# Patient Record
Sex: FEMALE | Race: WHITE | NOT HISPANIC OR LATINO | Employment: FULL TIME | URBAN - METROPOLITAN AREA
[De-identification: names, ages, dates, MRNs, and addresses within clinical notes are randomized per-mention and may not be internally consistent; named-entity substitution may affect disease eponyms.]

---

## 2018-09-20 ENCOUNTER — TRANSCRIBE ORDERS (OUTPATIENT)
Dept: ADMINISTRATIVE | Facility: HOSPITAL | Age: 48
End: 2018-09-20

## 2018-09-20 DIAGNOSIS — Z12.31 VISIT FOR SCREENING MAMMOGRAM: Primary | ICD-10-CM

## 2018-09-27 ENCOUNTER — OFFICE VISIT (OUTPATIENT)
Dept: OBSTETRICS AND GYNECOLOGY | Facility: CLINIC | Age: 48
End: 2018-09-27

## 2018-09-27 VITALS
WEIGHT: 205.6 LBS | HEIGHT: 68 IN | DIASTOLIC BLOOD PRESSURE: 78 MMHG | BODY MASS INDEX: 31.16 KG/M2 | SYSTOLIC BLOOD PRESSURE: 118 MMHG

## 2018-09-27 DIAGNOSIS — N32.81 DI (DETRUSOR INSTABILITY): ICD-10-CM

## 2018-09-27 DIAGNOSIS — F32.A DEPRESSION, UNSPECIFIED DEPRESSION TYPE: ICD-10-CM

## 2018-09-27 DIAGNOSIS — R10.31 RLQ ABDOMINAL PAIN: ICD-10-CM

## 2018-09-27 DIAGNOSIS — Z00.00 ANNUAL PHYSICAL EXAM: Primary | ICD-10-CM

## 2018-09-27 PROCEDURE — 99213 OFFICE O/P EST LOW 20 MIN: CPT | Performed by: OBSTETRICS & GYNECOLOGY

## 2018-09-27 PROCEDURE — 99386 PREV VISIT NEW AGE 40-64: CPT | Performed by: OBSTETRICS & GYNECOLOGY

## 2018-09-27 RX ORDER — OXYBUTYNIN CHLORIDE 5 MG/1
5 TABLET, EXTENDED RELEASE ORAL DAILY
Qty: 30 TABLET | Refills: 3 | Status: SHIPPED | OUTPATIENT
Start: 2018-09-27 | End: 2019-01-23

## 2018-09-27 NOTE — PROGRESS NOTES
AIDEE Briceno  is a 47 y.o. female who presents for several issues.  First, she would like to reestablish care and have a routine gynecologic exam.  She uses a Mirena for contraception.  It has been in place for 3 years and she is very satisfied with it.  She does not have menstrual cycles.  Second, she has an overactive bladder.  She reports strong urge to urinate followed by the loss of urine.  This has worsened over the years.  It has not improved with the use of Kegel's exercise.  It is not worsened by laughing coughing or sneezing.  It happens intermittently throughout the day.  Third, she has some right lower quadrant pain.  She denies any change in bowel habits.  No nausea or vomiting.  No fever or chills.  She is concerned regarding a family history of ovarian cancer.  Also, she has symptoms of anxiety and depression.  She reports fatigue, helplessness and hopelessness.  She denies any suicidal or homicidal ideation.    Chief Complaint   Patient presents with   • New Gyn     Patient is here for a new gyn annual and bladder issues with depresssion.       Past Medical History:   Diagnosis Date   • MS (multiple sclerosis) (CMS/[facility])        Past Surgical History:   Procedure Laterality Date   •  SECTION         Social History     Social History   • Marital status: Unknown     Spouse name: N/A   • Number of children: N/A   • Years of education: N/A     Occupational History   • Not on file.     Social History Main Topics   • Smoking status: Former Smoker   • Smokeless tobacco: Never Used   • Alcohol use No   • Drug use: No   • Sexual activity: No     Other Topics Concern   • Not on file     Social History Narrative   • No narrative on file       The following portions of the patient's history were reviewed and updated as appropriate: allergies, current medications, past family history, past medical history, past social history, past surgical history and problem list.    Review of Systems is positive for  vegetative signs of depression.  It is positive for urinary incontinence.  It is positive for right lower quadrant abdominal pain.  It is negative for nausea or vomiting.  It is negative for fever or chills.  All other systems are reviewed and are negative          Physical Exam   Constitutional: She is oriented to person, place, and time. She appears well-developed and well-nourished.   HENT:   Head: Normocephalic and atraumatic.   Cardiovascular: Normal rate and regular rhythm.    Pulmonary/Chest: Effort normal and breath sounds normal. She has no wheezes. She has no rales.   There are no palpable breast lumps.  Nipple discharge and axillary adenopathy are absent.   Abdominal: Soft. She exhibits no distension. There is no tenderness.   Genitourinary: Vagina normal and uterus normal. There is no lesion on the right labia. There is no lesion on the left labia. Cervix exhibits no motion tenderness. Right adnexum displays no mass and no tenderness. Left adnexum displays no mass and no tenderness. No vaginal discharge found.   Neurological: She is alert and oriented to person, place, and time.   Skin: Skin is warm and dry.   Nursing note and vitals reviewed.      Assessment    Jacqueline was seen today for new gyn.    Diagnoses and all orders for this visit:    Annual physical exam  -     Mammo screening bilateral w CAD; Future    DI (detrusor instability)    Depression, unspecified depression type    RLQ abdominal pain  -     US Non-ob Transvaginal    Other orders  -     oxybutynin XL (DITROPAN XL) 5 MG 24 hr tablet; Take 1 tablet by mouth Daily.        Plan  1. Normal gynecologic exam.  Pap smear performed.  2. Counseled regarding a cog recommendations for mammography every 1-2 years between the ages of 40 and 50.  The patient would like to proceed with a mammogram this year.  We will help her to arrange this.  3. Urge incontinence.  Counseled.  We discussed the pathophysiology of this as well as options for management.   The patient would like to try treatment with Ditropan.  We discussed the benefits, risks and alternatives to this.  She will follow up in 6 weeks to assess effectiveness.  4. Depression.  The patient denies suicidal or homicidal ideation.  Counseled.  She would like to consider an antidepressant.  I recommend that we cannot start to medications at the same time, so side effects can be adequately assessed.  The patient agrees with this.  She will follow this for now and when she returns for 6 week checkup, we can institute an antidepressant.  5. Right lower quadrant abdominal pain.  There is been no change in bowel habits.  No nausea or vomiting.  No fever or chills.  Counseled.  The patient is very concerned regarding the possibility of ovarian cancer.  I did not feel an adnexal mass on exam today.  We will check an ultrasound to fully assess the adnexa.    6. Return in about 6 weeks (around 11/8/2018).    History   Smoking Status   • Former Smoker   7.     8.

## 2018-10-01 LAB
CONV .: NORMAL
CYTOLOGIST CVX/VAG CYTO: NORMAL
CYTOLOGY CVX/VAG DOC THIN PREP: NORMAL
DX ICD CODE: NORMAL
HIV 1 & 2 AB SER-IMP: NORMAL
OTHER STN SPEC: NORMAL
PATH REPORT.FINAL DX SPEC: NORMAL
STAT OF ADQ CVX/VAG CYTO-IMP: NORMAL

## 2018-10-09 ENCOUNTER — HOSPITAL ENCOUNTER (OUTPATIENT)
Dept: MAMMOGRAPHY | Facility: HOSPITAL | Age: 48
Discharge: HOME | End: 2018-10-09
Attending: OBSTETRICS & GYNECOLOGY | Admitting: OBSTETRICS & GYNECOLOGY

## 2018-10-09 DIAGNOSIS — Z12.31 VISIT FOR SCREENING MAMMOGRAM: ICD-10-CM

## 2018-10-09 PROCEDURE — 77063 BREAST TOMOSYNTHESIS BI: CPT

## 2018-10-09 PROCEDURE — 77067 SCR MAMMO BI INCL CAD: CPT

## 2019-01-23 ENCOUNTER — TELEPHONE (OUTPATIENT)
Dept: OBSTETRICS AND GYNECOLOGY | Facility: CLINIC | Age: 49
End: 2019-01-23

## 2019-01-23 RX ORDER — FESOTERODINE FUMARATE 4 MG/1
4 TABLET, EXTENDED RELEASE ORAL
Qty: 30 TABLET | Refills: 11 | Status: SHIPPED | OUTPATIENT
Start: 2019-01-23 | End: 2019-06-11

## 2019-01-23 NOTE — TELEPHONE ENCOUNTER
Please contact the patient and let her know that a prescription for Toviaz has been sent to her pharmacy per her request.  Also, when she was in the office in September, she was very worried about her ovaries and an ultrasound had been ordered.  She has not scheduled the ultrasound.  Please inquire if she has had the ultrasound somewhere else or if she would like to schedule it here.  Thank you.

## 2019-01-23 NOTE — TELEPHONE ENCOUNTER
PT called, would like to try nkechi. States rx you prescribed works some but would like to try this medication. Pharmacy in system. PT #[phone]

## 2019-02-25 ENCOUNTER — TELEPHONE (OUTPATIENT)
Dept: OBSTETRICS AND GYNECOLOGY | Facility: CLINIC | Age: 49
End: 2019-02-25

## 2019-06-04 ENCOUNTER — TELEPHONE (OUTPATIENT)
Dept: FAMILY MEDICINE CLINIC | Facility: CLINIC | Age: 49
End: 2019-06-04

## 2019-06-04 NOTE — TELEPHONE ENCOUNTER
Pt has appointment to establish with E[facility]y on 6/19/19 but is having numbness in her fingers and toes. She does have MS and is currently being treated for it but her neuro believes she may have diabetes. Patient is concerned and wanted to know if she could be seen any sooner. Thank you.

## 2019-06-11 ENCOUNTER — OFFICE VISIT (OUTPATIENT)
Dept: FAMILY MEDICINE CLINIC | Facility: CLINIC | Age: 49
End: 2019-06-11

## 2019-06-11 VITALS
SYSTOLIC BLOOD PRESSURE: 110 MMHG | TEMPERATURE: 98.3 F | DIASTOLIC BLOOD PRESSURE: 68 MMHG | HEART RATE: 85 BPM | OXYGEN SATURATION: 98 % | WEIGHT: 196 LBS | BODY MASS INDEX: 29.7 KG/M2 | HEIGHT: 68 IN

## 2019-06-11 DIAGNOSIS — Z79.899 HIGH RISK MEDICATION USE: ICD-10-CM

## 2019-06-11 DIAGNOSIS — M54.32 SCIATICA OF LEFT SIDE: Primary | ICD-10-CM

## 2019-06-11 DIAGNOSIS — R20.2 PARESTHESIA: ICD-10-CM

## 2019-06-11 DIAGNOSIS — H53.9 VISION CHANGES: ICD-10-CM

## 2019-06-11 DIAGNOSIS — R53.83 FATIGUE, UNSPECIFIED TYPE: ICD-10-CM

## 2019-06-11 DIAGNOSIS — H46.9 OPTIC NEURITIS, RIGHT: ICD-10-CM

## 2019-06-11 DIAGNOSIS — G35 MS (MULTIPLE SCLEROSIS) (HCC): ICD-10-CM

## 2019-06-11 PROCEDURE — 99204 OFFICE O/P NEW MOD 45 MIN: CPT | Performed by: NURSE PRACTITIONER

## 2019-06-11 RX ORDER — MELOXICAM 15 MG/1
TABLET ORAL
COMMUNITY
Start: 2019-06-07 | End: 2020-01-23

## 2019-06-11 RX ORDER — OXYBUTYNIN CHLORIDE 10 MG/1
10 TABLET, EXTENDED RELEASE ORAL DAILY
Qty: 30 TABLET | Refills: 11 | Status: SHIPPED | OUTPATIENT
Start: 2019-06-11 | End: 2019-07-02

## 2019-06-11 RX ORDER — BUSPIRONE HYDROCHLORIDE 10 MG/1
TABLET ORAL
COMMUNITY
Start: 2019-06-07 | End: 2020-08-05

## 2019-06-11 NOTE — PROGRESS NOTES
Ms   Optic 2007    Dr Casarez  May  Min progression  May start med  Minor progession occ        Left knee problem  Saw Dr Lynn  Runners knee  Not a runner  mobic rx      Left knee parethesia    Down leg  Fu Dr Casarez  Mri brain  And cspine  No changer        urogyn  Last year

## 2019-06-11 NOTE — PATIENT INSTRUCTIONS
Back Exercises  The following exercises strengthen the muscles that help to support the back. They also help to keep the lower back flexible. Doing these exercises can help to prevent back pain or lessen existing pain.  If you have back pain or discomfort, try doing these exercises 2-3 times each day or as told by your health care provider. When the pain goes away, do them once each day, but increase the number of times that you repeat the steps for each exercise (do more repetitions). If you do not have back pain or discomfort, do these exercises once each day or as told by your health care provider.  Exercises  Single Knee to Chest  Repeat these steps 3-5 times for each le. Lie on your back on a firm bed or the floor with your legs extended.  2. Bring one knee to your chest. Your other leg should stay extended and in contact with the floor.  3. Hold your knee in place by grabbing your knee or thigh.  4. Pull on your knee until you feel a gentle stretch in your lower back.  5. Hold the stretch for 10-30 seconds.  6. Slowly release and straighten your leg.    Pelvic Tilt  Repeat these steps 5-10 times:  1. Lie on your back on a firm bed or the floor with your legs extended.  2. Bend your knees so they are pointing toward the ceiling and your feet are flat on the floor.  3. Tighten your lower abdominal muscles to press your lower back against the floor. This motion will tilt your pelvis so your tailbone points up toward the ceiling instead of pointing to your feet or the floor.  4. With gentle tension and even breathing, hold this position for 5-10 seconds.    Cat-Cow    Repeat these steps until your lower back becomes more flexible:  1. Get into a hands-and-knees position on a firm surface. Keep your hands under your shoulders, and keep your knees under your hips. You may place padding under your knees for comfort.  2. Let your head hang down, and point your tailbone toward the floor so your lower back becomes  rounded like the back of a cat.  3. Hold this position for 5 seconds.  4. Slowly lift your head and point your tailbone up toward the ceiling so your back forms a sagging arch like the back of a cow.  5. Hold this position for 5 seconds.    Press-Ups    Repeat these steps 5-10 times:  1. Lie on your abdomen (face-down) on the floor.  2. Place your palms near your head, about shoulder-width apart.  3. While you keep your back as relaxed as possible and keep your hips on the floor, slowly straighten your arms to raise the top half of your body and lift your shoulders. Do not use your back muscles to raise your upper torso. You may adjust the placement of your hands to make yourself more comfortable.  4. Hold this position for 5 seconds while you keep your back relaxed.  5. Slowly return to lying flat on the floor.    Bridges    Repeat these steps 10 times:  1. Lie on your back on a firm surface.  2. Bend your knees so they are pointing toward the ceiling and your feet are flat on the floor.  3. Tighten your buttocks muscles and lift your buttocks off of the floor until your waist is at almost the same height as your knees. You should feel the muscles working in your buttocks and the back of your thighs. If you do not feel these muscles, slide your feet 1-2 inches farther away from your buttocks.  4. Hold this position for 3-5 seconds.  5. Slowly lower your hips to the starting position, and allow your buttocks muscles to relax completely.    If this exercise is too easy, try doing it with your arms crossed over your chest.  Abdominal Crunches  Repeat these steps 5-10 times:  1. Lie on your back on a firm bed or the floor with your legs extended.  2. Bend your knees so they are pointing toward the ceiling and your feet are flat on the floor.  3. Cross your arms over your chest.  4. Tip your chin slightly toward your chest without bending your neck.  5. Tighten your abdominal muscles and slowly raise your trunk (torso)  high enough to lift your shoulder blades a tiny bit off of the floor. Avoid raising your torso higher than that, because it can put too much stress on your low back and it does not help to strengthen your abdominal muscles.  6. Slowly return to your starting position.    Back Lifts  Repeat these steps 5-10 times:  1. Lie on your abdomen (face-down) with your arms at your sides, and rest your forehead on the floor.  2. Tighten the muscles in your legs and your buttocks.  3. Slowly lift your chest off of the floor while you keep your hips pressed to the floor. Keep the back of your head in line with the curve in your back. Your eyes should be looking at the floor.  4. Hold this position for 3-5 seconds.  5. Slowly return to your starting position.    Contact a health care provider if:  · Your back pain or discomfort gets much worse when you do an exercise.  · Your back pain or discomfort does not lessen within 2 hours after you exercise.  If you have any of these problems, stop doing these exercises right away. Do not do them again unless your health care provider says that you can.  Get help right away if:  · You develop sudden, severe back pain. If this happens, stop doing the exercises right away. Do not do them again unless your health care provider says that you can.  This information is not intended to replace advice given to you by your health care provider. Make sure you discuss any questions you have with your health care provider.  Document Released: 01/25/2006 Document Revised: 07/31/2018 Document Reviewed: 02/11/2016  [employer] Interactive Patient Education © 2019 [employer]

## 2019-06-11 NOTE — PROGRESS NOTES
"Subjective   Jacqueline Briceno is a 48 y.o. female.     Pleasant patient needs new PCP  \"Complains of paresthesia left lower extremity numbness sensation anterior thigh anterior shin and somewhat medial to the feet.  Sometimes tingling the feet.  Left  No weakness no back pain no bowel or bladder change  No new headaches although she does have intermittent vision difficulty bilateral  No acute slurred speech vision loss nausea vomiting chest pain or shortness of breath  Dr. CASAREZ is requested labs  At this time does not suspect MS regarding the paresthesia  Patient has no history of transverse myelitis    Presently being treated for multiple sclerosis  Diagnosed optic neuritis in 2017 and shortly after multiple sclerosis  Ms   Optic 2007    Dr Casarez  May  Min progression  May start med   Minor progession occ    chronic issues of urge incontinence has to wear several pads per day  No dysuria frequency  No pelvic pain  She had a work-up several years ago  But did not complete a follow-up        Left knee problem  Saw Dr Lynn  Runners knee  Not a runner  mobic rx      Left knee parethesia    Down leg  Fu Dr Casarez  Mri brain  And cspine  No changer        urogyn  Last year         The following portions of the patient's history were reviewed and updated as appropriate: allergies, current medications, past family history, past medical history, past social history, past surgical history and problem list.    Review of Systems   Constitutional: Negative for chills, fatigue, fever and unexpected weight loss.   HENT: Negative.  Negative for trouble swallowing.    Eyes: Positive for blurred vision and visual disturbance. Negative for photophobia, pain and redness.   Respiratory: Negative for cough and shortness of breath.    Cardiovascular: Negative for chest pain, palpitations and leg swelling.   Gastrointestinal: Negative for abdominal pain and blood in stool.   Genitourinary: Negative.  Negative for pelvic pain.   Musculoskeletal: " Negative.    Skin: Negative.    Neurological: Positive for numbness. Negative for dizziness, tremors, seizures, syncope, facial asymmetry, speech difficulty, weakness, light-headedness, headache, memory problem and confusion.   Psychiatric/Behavioral: Negative.        Objective   Physical Exam   Constitutional: She is oriented to person, place, and time. She appears well-developed and well-nourished.   HENT:   Head: Normocephalic and atraumatic.   Mouth/Throat: Oropharynx is clear and moist. No oropharyngeal exudate.   Eyes: Conjunctivae are normal. Pupils are equal, round, and reactive to light.   Neck: Neck supple. No JVD present. No thyromegaly present.   Cardiovascular: Normal rate, regular rhythm and normal heart sounds. Exam reveals no friction rub.   No murmur heard.  Pulmonary/Chest: Effort normal and breath sounds normal. No respiratory distress. She has no wheezes.   Abdominal: Soft. Bowel sounds are normal. She exhibits no distension and no mass. There is no tenderness. There is no guarding. No hernia.   Musculoskeletal: Normal range of motion. She exhibits no edema, tenderness or deformity.   Negative straight leg raise, plantar flexion dorsiflexion normal  Normal gait no  t  Normal sensation neurovascular intact  Normal posture   Lymphadenopathy:     She has no cervical adenopathy.   Neurological: She is alert and oriented to person, place, and time. She displays normal reflexes. No cranial nerve deficit or sensory deficit. She exhibits normal muscle tone. Coordination normal.   Skin: Skin is warm and dry.   Psychiatric: She has a normal mood and affect. Her behavior is normal. Judgment and thought content normal.   Vitals reviewed.        Assessment/Plan   Jacqueline was seen today for numbness.    Diagnoses and all orders for this visit:    Sciatica of left side  -     CBC & Differential  -     Comprehensive Metabolic Panel  -     Hemoglobin A1c  -     Thyroid Cascade Profile  -     Vitamin B12  -      Vitamin B6  -     QuantiFERON TB Gold  -     TARA + PE  -     Urinalysis With Microscopic If Indicated (No Culture) - Urine, Clean Catch    Paresthesia  -     CBC & Differential  -     Comprehensive Metabolic Panel  -     Hemoglobin A1c  -     Thyroid Cascade Profile  -     Vitamin B12  -     Vitamin B6  -     QuantiFERON TB Gold  -     TARA + PE  -     Urinalysis With Microscopic If Indicated (No Culture) - Urine, Clean Catch    Vision changes  -     Ambulatory Referral to Ophthalmology  -     CBC & Differential  -     Comprehensive Metabolic Panel  -     Hemoglobin A1c  -     Thyroid Cascade Profile  -     Vitamin B12  -     Vitamin B6  -     QuantiFERON TB Gold  -     TARA + PE  -     Urinalysis With Microscopic If Indicated (No Culture) - Urine, Clean Catch    Fatigue, unspecified type  -     CBC & Differential  -     Comprehensive Metabolic Panel  -     Hemoglobin A1c  -     Thyroid Cascade Profile  -     Vitamin B12  -     Vitamin B6  -     QuantiFERON TB Gold  -     TARA + PE  -     Urinalysis With Microscopic If Indicated (No Culture) - Urine, Clean Catch    Optic neuritis, right  -     CBC & Differential  -     Comprehensive Metabolic Panel  -     Hemoglobin A1c  -     Thyroid Cascade Profile  -     Vitamin B12  -     Vitamin B6  -     QuantiFERON TB Gold  -     TARA + PE  -     Urinalysis With Microscopic If Indicated (No Culture) - Urine, Clean Catch    MS (multiple sclerosis) (CMS/HCC)  -     CBC & Differential  -     Comprehensive Metabolic Panel  -     Hemoglobin A1c  -     Thyroid Cascade Profile  -     Vitamin B12  -     Vitamin B6  -     QuantiFERON TB Gold  -     TARA + PE  -     Urinalysis With Microscopic If Indicated (No Culture) - Urine, Clean Catch    High risk medication use  -     CBC & Differential  -     Comprehensive Metabolic Panel  -     Hemoglobin A1c  -     Thyroid Cascade Profile  -     Vitamin B12  -     Vitamin B6  -     QuantiFERON TB Gold  -     TARA + PE  -     Urinalysis With  Microscopic If Indicated (No Culture) - Urine, Clean Catch    Other orders  -     oxybutynin XL (DITROPAN XL) 10 MG 24 hr tablet; Take 1 tablet by mouth Daily. For overactive bladder urge incontinence                  Patient Instructions   Back Exercises  The following exercises strengthen the muscles that help to support the back. They also help to keep the lower back flexible. Doing these exercises can help to prevent back pain or lessen existing pain.  If you have back pain or discomfort, try doing these exercises 2-3 times each day or as told by your health care provider. When the pain goes away, do them once each day, but increase the number of times that you repeat the steps for each exercise (do more repetitions). If you do not have back pain or discomfort, do these exercises once each day or as told by your health care provider.  Exercises  Single Knee to Chest  Repeat these steps 3-5 times for each le. Lie on your back on a firm bed or the floor with your legs extended.  2. Bring one knee to your chest. Your other leg should stay extended and in contact with the floor.  3. Hold your knee in place by grabbing your knee or thigh.  4. Pull on your knee until you feel a gentle stretch in your lower back.  5. Hold the stretch for 10-30 seconds.  6. Slowly release and straighten your leg.    Pelvic Tilt  Repeat these steps 5-10 times:  1. Lie on your back on a firm bed or the floor with your legs extended.  2. Bend your knees so they are pointing toward the ceiling and your feet are flat on the floor.  3. Tighten your lower abdominal muscles to press your lower back against the floor. This motion will tilt your pelvis so your tailbone points up toward the ceiling instead of pointing to your feet or the floor.  4. With gentle tension and even breathing, hold this position for 5-10 seconds.    Cat-Cow    Repeat these steps until your lower back becomes more flexible:  1. Get into a hands-and-knees position on  a firm surface. Keep your hands under your shoulders, and keep your knees under your hips. You may place padding under your knees for comfort.  2. Let your head hang down, and point your tailbone toward the floor so your lower back becomes rounded like the back of a cat.  3. Hold this position for 5 seconds.  4. Slowly lift your head and point your tailbone up toward the ceiling so your back forms a sagging arch like the back of a cow.  5. Hold this position for 5 seconds.    Press-Ups    Repeat these steps 5-10 times:  1. Lie on your abdomen (face-down) on the floor.  2. Place your palms near your head, about shoulder-width apart.  3. While you keep your back as relaxed as possible and keep your hips on the floor, slowly straighten your arms to raise the top half of your body and lift your shoulders. Do not use your back muscles to raise your upper torso. You may adjust the placement of your hands to make yourself more comfortable.  4. Hold this position for 5 seconds while you keep your back relaxed.  5. Slowly return to lying flat on the floor.    Bridges    Repeat these steps 10 times:  1. Lie on your back on a firm surface.  2. Bend your knees so they are pointing toward the ceiling and your feet are flat on the floor.  3. Tighten your buttocks muscles and lift your buttocks off of the floor until your waist is at almost the same height as your knees. You should feel the muscles working in your buttocks and the back of your thighs. If you do not feel these muscles, slide your feet 1-2 inches farther away from your buttocks.  4. Hold this position for 3-5 seconds.  5. Slowly lower your hips to the starting position, and allow your buttocks muscles to relax completely.    If this exercise is too easy, try doing it with your arms crossed over your chest.  Abdominal Crunches  Repeat these steps 5-10 times:  1. Lie on your back on a firm bed or the floor with your legs extended.  2. Bend your knees so they are  pointing toward the ceiling and your feet are flat on the floor.  3. Cross your arms over your chest.  4. Tip your chin slightly toward your chest without bending your neck.  5. Tighten your abdominal muscles and slowly raise your trunk (torso) high enough to lift your shoulder blades a tiny bit off of the floor. Avoid raising your torso higher than that, because it can put too much stress on your low back and it does not help to strengthen your abdominal muscles.  6. Slowly return to your starting position.    Back Lifts  Repeat these steps 5-10 times:  1. Lie on your abdomen (face-down) with your arms at your sides, and rest your forehead on the floor.  2. Tighten the muscles in your legs and your buttocks.  3. Slowly lift your chest off of the floor while you keep your hips pressed to the floor. Keep the back of your head in line with the curve in your back. Your eyes should be looking at the floor.  4. Hold this position for 3-5 seconds.  5. Slowly return to your starting position.    Contact a health care provider if:  · Your back pain or discomfort gets much worse when you do an exercise.  · Your back pain or discomfort does not lessen within 2 hours after you exercise.  If you have any of these problems, stop doing these exercises right away. Do not do them again unless your health care provider says that you can.  Get help right away if:  · You develop sudden, severe back pain. If this happens, stop doing the exercises right away. Do not do them again unless your health care provider says that you can.  This information is not intended to replace advice given to you by your health care provider. Make sure you discuss any questions you have with your health care provider.  Document Released: 01/25/2006 Document Revised: 07/31/2018 Document Reviewed: 02/11/2016  [employer] Interactive Patient Education © 2019 [employer]

## 2019-06-18 ENCOUNTER — LAB (OUTPATIENT)
Dept: LAB | Facility: HOSPITAL | Age: 49
End: 2019-06-18

## 2019-06-18 DIAGNOSIS — Z79.899 HIGH RISK MEDICATION USE: ICD-10-CM

## 2019-06-18 DIAGNOSIS — H46.9 OPTIC NEURITIS, RIGHT: ICD-10-CM

## 2019-06-18 DIAGNOSIS — R20.2 PARESTHESIA: Primary | ICD-10-CM

## 2019-06-18 DIAGNOSIS — H53.9 VISION CHANGES: ICD-10-CM

## 2019-06-18 DIAGNOSIS — M54.32 SCIATICA OF LEFT SIDE: ICD-10-CM

## 2019-06-18 DIAGNOSIS — G35 MS (MULTIPLE SCLEROSIS) (HCC): ICD-10-CM

## 2019-06-18 DIAGNOSIS — R53.83 FATIGUE, UNSPECIFIED TYPE: ICD-10-CM

## 2019-06-18 LAB
ALBUMIN SERPL-MCNC: 4.8 G/DL (ref 3.5–5.2)
ALBUMIN/GLOB SERPL: 1.7 G/DL
ALP SERPL-CCNC: 70 U/L (ref 39–117)
ALT SERPL W P-5'-P-CCNC: 13 U/L (ref 1–33)
ANION GAP SERPL CALCULATED.3IONS-SCNC: 11.1 MMOL/L
AST SERPL-CCNC: 13 U/L (ref 1–32)
BACTERIA UR QL AUTO: ABNORMAL /HPF
BASOPHILS # BLD AUTO: 0.05 10*3/MM3 (ref 0–0.2)
BASOPHILS NFR BLD AUTO: 0.9 % (ref 0–1.5)
BILIRUB SERPL-MCNC: 0.6 MG/DL (ref 0.2–1.2)
BILIRUB UR QL STRIP: NEGATIVE
BUN BLD-MCNC: 12 MG/DL (ref 6–20)
BUN/CREAT SERPL: 11.3 (ref 7–25)
CALCIUM SPEC-SCNC: 9.7 MG/DL (ref 8.6–10.5)
CHLORIDE SERPL-SCNC: 104 MMOL/L (ref 98–107)
CLARITY UR: ABNORMAL
CO2 SERPL-SCNC: 28.9 MMOL/L (ref 22–29)
COLOR UR: YELLOW
CREAT BLD-MCNC: 1.06 MG/DL (ref 0.57–1)
DEPRECATED RDW RBC AUTO: 43.8 FL (ref 37–54)
EOSINOPHIL # BLD AUTO: 0.07 10*3/MM3 (ref 0–0.4)
EOSINOPHIL NFR BLD AUTO: 1.3 % (ref 0.3–6.2)
ERYTHROCYTE [DISTWIDTH] IN BLOOD BY AUTOMATED COUNT: 12.7 % (ref 12.3–15.4)
GFR SERPL CREATININE-BSD FRML MDRD: 56 ML/MIN/1.73
GLOBULIN UR ELPH-MCNC: 2.8 GM/DL
GLUCOSE BLD-MCNC: 88 MG/DL (ref 65–99)
GLUCOSE UR STRIP-MCNC: NEGATIVE MG/DL
HBA1C MFR BLD: 5 % (ref 4.8–5.6)
HCT VFR BLD AUTO: 40.7 % (ref 34–46.6)
HGB BLD-MCNC: 12.8 G/DL (ref 12–15.9)
HGB UR QL STRIP.AUTO: ABNORMAL
HYALINE CASTS UR QL AUTO: ABNORMAL /LPF
IMM GRANULOCYTES # BLD AUTO: 0.02 10*3/MM3 (ref 0–0.05)
IMM GRANULOCYTES NFR BLD AUTO: 0.4 % (ref 0–0.5)
KETONES UR QL STRIP: NEGATIVE
LEUKOCYTE ESTERASE UR QL STRIP.AUTO: ABNORMAL
LYMPHOCYTES # BLD AUTO: 1.18 10*3/MM3 (ref 0.7–3.1)
LYMPHOCYTES NFR BLD AUTO: 21.1 % (ref 19.6–45.3)
MCH RBC QN AUTO: 29.4 PG (ref 26.6–33)
MCHC RBC AUTO-ENTMCNC: 31.4 G/DL (ref 31.5–35.7)
MCV RBC AUTO: 93.6 FL (ref 79–97)
MONOCYTES # BLD AUTO: 0.29 10*3/MM3 (ref 0.1–0.9)
MONOCYTES NFR BLD AUTO: 5.2 % (ref 5–12)
NEUTROPHILS # BLD AUTO: 3.98 10*3/MM3 (ref 1.7–7)
NEUTROPHILS NFR BLD AUTO: 71.1 % (ref 42.7–76)
NITRITE UR QL STRIP: POSITIVE
NRBC BLD AUTO-RTO: 0 /100 WBC (ref 0–0.2)
PH UR STRIP.AUTO: 5.5 [PH] (ref 5–8)
PLATELET # BLD AUTO: 196 10*3/MM3 (ref 140–450)
PMV BLD AUTO: 11.5 FL (ref 6–12)
POTASSIUM BLD-SCNC: 4.2 MMOL/L (ref 3.5–5.2)
PROT SERPL-MCNC: 7.6 G/DL (ref 6–8.5)
PROT UR QL STRIP: NEGATIVE
RBC # BLD AUTO: 4.35 10*6/MM3 (ref 3.77–5.28)
RBC # UR: ABNORMAL /HPF
REF LAB TEST METHOD: ABNORMAL
SODIUM BLD-SCNC: 144 MMOL/L (ref 136–145)
SP GR UR STRIP: 1.01 (ref 1–1.03)
SQUAMOUS #/AREA URNS HPF: ABNORMAL /HPF
UROBILINOGEN UR QL STRIP: ABNORMAL
VIT B12 BLD-MCNC: 1603 PG/ML (ref 211–946)
WBC NRBC COR # BLD: 5.59 10*3/MM3 (ref 3.4–10.8)
WBC UR QL AUTO: ABNORMAL /HPF

## 2019-06-18 PROCEDURE — 82784 ASSAY IGA/IGD/IGG/IGM EACH: CPT | Performed by: NURSE PRACTITIONER

## 2019-06-18 PROCEDURE — 36415 COLL VENOUS BLD VENIPUNCTURE: CPT | Performed by: NURSE PRACTITIONER

## 2019-06-18 PROCEDURE — 86480 TB TEST CELL IMMUN MEASURE: CPT

## 2019-06-18 PROCEDURE — 84207 ASSAY OF VITAMIN B-6: CPT | Performed by: NURSE PRACTITIONER

## 2019-06-18 PROCEDURE — 85025 COMPLETE CBC W/AUTO DIFF WBC: CPT | Performed by: NURSE PRACTITIONER

## 2019-06-18 PROCEDURE — 84443 ASSAY THYROID STIM HORMONE: CPT | Performed by: NURSE PRACTITIONER

## 2019-06-18 PROCEDURE — 82607 VITAMIN B-12: CPT | Performed by: NURSE PRACTITIONER

## 2019-06-18 PROCEDURE — 81001 URINALYSIS AUTO W/SCOPE: CPT | Performed by: NURSE PRACTITIONER

## 2019-06-18 PROCEDURE — 83036 HEMOGLOBIN GLYCOSYLATED A1C: CPT | Performed by: NURSE PRACTITIONER

## 2019-06-18 PROCEDURE — 86334 IMMUNOFIX E-PHORESIS SERUM: CPT | Performed by: NURSE PRACTITIONER

## 2019-06-18 PROCEDURE — 80053 COMPREHEN METABOLIC PANEL: CPT | Performed by: NURSE PRACTITIONER

## 2019-06-18 PROCEDURE — 84165 PROTEIN E-PHORESIS SERUM: CPT | Performed by: NURSE PRACTITIONER

## 2019-06-19 LAB
ALBUMIN SERPL-MCNC: 4.1 G/DL (ref 2.9–4.4)
ALBUMIN/GLOB SERPL: 1.3 {RATIO} (ref 0.7–1.7)
ALPHA1 GLOB FLD ELPH-MCNC: 0.2 G/DL (ref 0–0.4)
ALPHA2 GLOB SERPL ELPH-MCNC: 0.6 G/DL (ref 0.4–1)
B-GLOBULIN SERPL ELPH-MCNC: 1 G/DL (ref 0.7–1.3)
GAMMA GLOB SERPL ELPH-MCNC: 1.4 G/DL (ref 0.4–1.8)
GLOBULIN SER CALC-MCNC: 3.2 G/DL (ref 2.2–3.9)
IGA SERPL-MCNC: 149 MG/DL (ref 87–352)
IGG SERPL-MCNC: 1357 MG/DL (ref 700–1600)
IGM SERPL-MCNC: 105 MG/DL (ref 26–217)
INTERPRETATION SERPL IEP-IMP: NORMAL
Lab: NORMAL
M-SPIKE: NORMAL G/DL
PROT SERPL-MCNC: 7.3 G/DL (ref 6–8.5)
TSH SERPL-ACNC: 1.57 UIU/ML (ref 0.45–4.5)

## 2019-06-21 LAB
QUANTIFERON CRITERIA: NORMAL
QUANTIFERON MITOGEN VALUE: >10 IU/ML
QUANTIFERON NIL VALUE: 0.01 IU/ML
QUANTIFERON TB1 AG VALUE: 0.02 IU/ML
QUANTIFERON TB2 AG VALUE: 0.02 IU/ML
QUANTIFERON-TB GOLD PLUS: NEGATIVE
VIT B6 SERPL-MCNC: 12.5 UG/L (ref 2–32.8)

## 2019-06-21 RX ORDER — NITROFURANTOIN 25; 75 MG/1; MG/1
100 CAPSULE ORAL 2 TIMES DAILY
Qty: 10 CAPSULE | Refills: 0 | Status: SHIPPED | OUTPATIENT
Start: 2019-06-21 | End: 2019-06-26

## 2019-07-01 ENCOUNTER — TELEPHONE (OUTPATIENT)
Dept: FAMILY MEDICINE CLINIC | Facility: CLINIC | Age: 49
End: 2019-07-01

## 2019-07-01 NOTE — TELEPHONE ENCOUNTER
Patient c/o of blurred vision with oxybutynin. Wants to know if theres something else she can try.

## 2019-07-02 RX ORDER — TOLTERODINE 4 MG/1
4 CAPSULE, EXTENDED RELEASE ORAL DAILY
Qty: 30 CAPSULE | Refills: 5 | Status: SHIPPED | OUTPATIENT
Start: 2019-07-02 | End: 2020-08-05

## 2019-07-02 NOTE — TELEPHONE ENCOUNTER
Discontinue oxybutynin    Detrol LA 4 mg  I sent a prescription and once a day as a trial  Discontinue if problems were not effective

## 2019-08-06 ENCOUNTER — TELEPHONE (OUTPATIENT)
Dept: FAMILY MEDICINE CLINIC | Facility: CLINIC | Age: 49
End: 2019-08-06

## 2019-08-06 DIAGNOSIS — R32 URINARY INCONTINENCE, UNSPECIFIED TYPE: Primary | ICD-10-CM

## 2019-08-06 DIAGNOSIS — M25.561 PAIN IN BOTH KNEES, UNSPECIFIED CHRONICITY: ICD-10-CM

## 2019-08-06 DIAGNOSIS — M25.562 PAIN IN BOTH KNEES, UNSPECIFIED CHRONICITY: ICD-10-CM

## 2019-08-06 DIAGNOSIS — M25.569 KNEE PAIN, UNSPECIFIED CHRONICITY, UNSPECIFIED LATERALITY: ICD-10-CM

## 2019-08-06 DIAGNOSIS — G35 MS (MULTIPLE SCLEROSIS) (HCC): ICD-10-CM

## 2019-08-06 NOTE — TELEPHONE ENCOUNTER
Patient is requesting referral to Gyn her previous gyn retired. Urogyn. For bladder incontinence, would like 2nd opinion for MS, & ortho for knees.    Advise.

## 2019-08-13 ENCOUNTER — OFFICE VISIT (OUTPATIENT)
Dept: FAMILY MEDICINE CLINIC | Facility: CLINIC | Age: 49
End: 2019-08-13

## 2019-08-13 VITALS
WEIGHT: 191 LBS | OXYGEN SATURATION: 98 % | DIASTOLIC BLOOD PRESSURE: 70 MMHG | HEIGHT: 68 IN | BODY MASS INDEX: 28.95 KG/M2 | HEART RATE: 75 BPM | SYSTOLIC BLOOD PRESSURE: 130 MMHG

## 2019-08-13 DIAGNOSIS — R20.2 PARESTHESIA OF BILATERAL LEGS: ICD-10-CM

## 2019-08-13 DIAGNOSIS — G35 MS (MULTIPLE SCLEROSIS) (HCC): Primary | ICD-10-CM

## 2019-08-13 DIAGNOSIS — H53.9 CHANGE IN VISION: ICD-10-CM

## 2019-08-13 DIAGNOSIS — H46.9 OPTIC NEURITIS, RIGHT: ICD-10-CM

## 2019-08-13 DIAGNOSIS — R26.81 GAIT INSTABILITY: ICD-10-CM

## 2019-08-13 DIAGNOSIS — R29.898 WEAKNESS OF BOTH LEGS: ICD-10-CM

## 2019-08-13 PROCEDURE — 99213 OFFICE O/P EST LOW 20 MIN: CPT | Performed by: NURSE PRACTITIONER

## 2019-08-13 RX ORDER — LAMOTRIGINE 150 MG/1
TABLET ORAL
COMMUNITY
Start: 2019-06-26 | End: 2020-08-05

## 2019-08-13 RX ORDER — RENAGEL 800 MG/1
TABLET ORAL
COMMUNITY
Start: 2019-07-22 | End: 2020-01-23

## 2019-08-14 PROBLEM — R26.81 GAIT INSTABILITY: Status: ACTIVE | Noted: 2019-08-14

## 2019-08-14 PROBLEM — R29.898 WEAKNESS OF BOTH LEGS: Status: ACTIVE | Noted: 2019-08-14

## 2019-08-14 NOTE — PROGRESS NOTES
Subjective   Jacqueline Briceno is a 48 y.o. female.     Pleasant patient here today concerned regarding newer symptoms of paresthesias lower extremities over the last several months which have progressed.  She complains of increased paresthesias lower extremity from knees down, described as paresthesias below the knee in a stocking distribution.  Some discomfort but does not describe any severe pain.  Previously she described paresthesias down her left leg and discomfort, but over the last several weeks has changed to include right lower extremity as well knee down to toes globally.  she is having increased difficulty walking especially in heated environments  Such as hot summer weather.  History of optic neuritis several years ago right side  She is had some vision difficulties in her right eye since previous optic neuritis she has no pain in her right but feels like she is having more vision difficulties.  She is had no confusion weakness or slurred speech.  No fevers or chills presently no headache.    She has a known history of MS, sees Dr. Casarez, neurologist.  For quite a few years neurologist, for quite a few years.  She has seen him several months ago, he had ordered an MRI of the brain with and without contrast.  She was unable to complete the MRI with contrast of her brain due to IV difficulties.  She had a MRI of the C-spine as well.  At that time he was uncertain etiology of her paresthesias according to patient.  Her symptoms since she feels like have increased of her paresthesias and is having more walking difficulty.  She has tried to schedule appointment with Dr. Casarez, and cannot get in until November.    She is requesting a second opinion as well with a separate neurologist, but she would likely will not get in for 6 weeks or more.    She has had urinary frequency and incontinence, recently started on Detrol LA 4 mg generic she is had a considerable improvement of her symptoms, but wondering if we can  increase the dose?  No burning frequency urgency presently no fevers or chills or flank pain.    Recent labs requested by Dr. ECHEVARRIA a couple months ago, patient states she was supposed to get a vitamin D testing, as well as a test he ordered to evaluate or rule out Devic's disease or transverse myelitis however these test were not included, and her most recent labs.    B12 and folate second evaluated in the past according to patient and have been normal    aubagio MS compliant             The following portions of the patient's history were reviewed and updated as appropriate: allergies, current medications, past family history, past medical history, past social history, past surgical history and problem list.    Review of Systems   Constitutional: Positive for fatigue.   HENT: Negative.    Eyes: Positive for visual disturbance. Negative for double vision and photophobia.   Respiratory: Negative.  Negative for cough and shortness of breath.    Cardiovascular: Negative for chest pain.   Gastrointestinal: Negative for abdominal distention.   Genitourinary: Positive for frequency.   Musculoskeletal: Positive for gait problem. Negative for arthralgias, myalgias, neck pain and neck stiffness.   Neurological: Positive for weakness and numbness. Negative for dizziness, tremors, seizures, syncope, facial asymmetry, speech difficulty, light-headedness, headache, memory problem and confusion.   All other systems reviewed and are negative.      Objective   Physical Exam   Constitutional: She is oriented to person, place, and time. She appears well-developed and well-nourished.   HENT:   Head: Normocephalic.   Mouth/Throat: Oropharynx is clear and moist. No oropharyngeal exudate.   Eyes: Conjunctivae are normal. Pupils are equal, round, and reactive to light.   Neck: Neck supple. No JVD present.   Cardiovascular: Normal rate, regular rhythm and normal heart sounds. Exam reveals no friction rub.   No murmur  heard.  Pulmonary/Chest: Effort normal and breath sounds normal. No respiratory distress. She has no wheezes.   Abdominal: Soft. Bowel sounds are normal. She exhibits no distension and no mass. There is no tenderness. There is no guarding. No hernia.   Musculoskeletal: She exhibits no edema or tenderness.   No lower extremity weakness  Gait appears normal   Lymphadenopathy:     She has no cervical adenopathy.   Neurological: She is alert and oriented to person, place, and time. She displays normal reflexes. No cranial nerve deficit or sensory deficit. She exhibits normal muscle tone. Coordination normal.   Skin: Skin is warm and dry.   Psychiatric: She has a normal mood and affect. Her behavior is normal. Judgment and thought content normal.   Vitals reviewed.        Assessment/Plan   Jacqueline was seen today for nerve issue.    Diagnoses and all orders for this visit:    MS (multiple sclerosis) (CMS/[facility])  -     MRI brain w wo contrast  -     MRI Lumbar Spine Without Contrast  -     NMO IgG Autoantibodies; Future  -     Vitamin D 25 Hydroxy; Future    Paresthesia of bilateral legs  -     MRI brain w wo contrast  -     MRI Lumbar Spine Without Contrast  -     NMO IgG Autoantibodies; Future  -     Vitamin D 25 Hydroxy; Future    Weakness of both legs  -     MRI brain w wo contrast  -     MRI Lumbar Spine Without Contrast  -     NMO IgG Autoantibodies; Future  -     Vitamin D 25 Hydroxy; Future    Change in vision  -     MRI brain w wo contrast  -     MRI Lumbar Spine Without Contrast  -     NMO IgG Autoantibodies; Future  -     Vitamin D 25 Hydroxy; Future    Gait instability  -     NMO IgG Autoantibodies; Future  -     Vitamin D 25 Hydroxy; Future      Patient been referred to neurology, scheduling an appointment scheduling is pending.  She is trying to get into a neurologist in [facility] referred by a friend appointment 6 weeks more out    MRI of the brain with contrast to evaluate perceived increase in paresthesias  lower extremities increasing gait instability and proceed weakness lower extremities during heat as well as some increased vision difficulties right side,  In a patient with a history of optic neuritis and  MS.    Dressings patient concerned that her MS is progressing possibly,  As well as some gait difficulties, MRI lumbar spine rule out MS lesions or any evidence of transverse myelitis    If patient has any increased weakness increased gait difficulties  Increased urinary incontinence weakness groin paresthesias  Significant vision change emergency room        Continue generic Detrol LA 4 mg daily no change at this time  She has had improvement in her symptoms  Dietary changes as well encourage decrease spicy foods caffeinated beverages etc.      Office visit 35 to 40 minutes            There are no Patient Instructions on file for this visit.

## 2019-08-15 ENCOUNTER — RESULTS ENCOUNTER (OUTPATIENT)
Dept: FAMILY MEDICINE CLINIC | Facility: CLINIC | Age: 49
End: 2019-08-15

## 2019-08-15 DIAGNOSIS — R20.2 PARESTHESIA OF BILATERAL LEGS: ICD-10-CM

## 2019-08-15 DIAGNOSIS — G35 MS (MULTIPLE SCLEROSIS) (HCC): ICD-10-CM

## 2019-08-15 DIAGNOSIS — R26.81 GAIT INSTABILITY: ICD-10-CM

## 2019-08-15 DIAGNOSIS — H53.9 CHANGE IN VISION: ICD-10-CM

## 2019-08-15 DIAGNOSIS — R29.898 WEAKNESS OF BOTH LEGS: ICD-10-CM

## 2019-08-16 DIAGNOSIS — R29.898 WEAKNESS OF BOTH LEGS: ICD-10-CM

## 2019-08-16 DIAGNOSIS — R53.83 OTHER FATIGUE: ICD-10-CM

## 2019-08-16 DIAGNOSIS — H53.9 CHANGE IN VISION: ICD-10-CM

## 2019-08-16 DIAGNOSIS — R26.81 GAIT INSTABILITY: ICD-10-CM

## 2019-08-16 DIAGNOSIS — H46.9 OPTIC NEURITIS, RIGHT: ICD-10-CM

## 2019-08-16 DIAGNOSIS — G35 MS (MULTIPLE SCLEROSIS) (HCC): Primary | ICD-10-CM

## 2019-08-16 DIAGNOSIS — R20.2 PARESTHESIA OF BILATERAL LEGS: ICD-10-CM

## 2019-08-21 ENCOUNTER — TELEPHONE (OUTPATIENT)
Dept: FAMILY MEDICINE CLINIC | Facility: CLINIC | Age: 49
End: 2019-08-21

## 2019-08-21 NOTE — TELEPHONE ENCOUNTER
Patient would like a referral to Dr. Nava Neurology for second opinion.    Advise     Fax number [phone]

## 2019-08-21 NOTE — TELEPHONE ENCOUNTER
Please assist patient with this referral neurology    As well as I had a consultation with a very helpful neurologist regarding patient  The neurologist with with her insurance company and actually approved  MRI of the brain C-spine thoracic and lumbar with and without    I wanted to make sure you had received that information with the new orders as well thanks!!

## 2019-08-22 DIAGNOSIS — H46.9 OPTIC NEURITIS, RIGHT: ICD-10-CM

## 2019-08-22 DIAGNOSIS — G35 MS (MULTIPLE SCLEROSIS) (HCC): ICD-10-CM

## 2019-08-22 DIAGNOSIS — R26.81 GAIT INSTABILITY: ICD-10-CM

## 2019-08-22 DIAGNOSIS — R29.898 WEAKNESS OF BOTH LEGS: ICD-10-CM

## 2019-08-22 DIAGNOSIS — H53.9 CHANGE IN VISION: ICD-10-CM

## 2019-08-22 DIAGNOSIS — R20.2 PARESTHESIA OF BILATERAL LEGS: Primary | ICD-10-CM

## 2019-08-23 ENCOUNTER — TELEPHONE (OUTPATIENT)
Dept: FAMILY MEDICINE CLINIC | Facility: CLINIC | Age: 49
End: 2019-08-23

## 2019-08-23 NOTE — TELEPHONE ENCOUNTER
Patient called wanting to know if you sent her handicap form in the mail. I told her it can take 5-7 days to recieve it, but I did not see any documentation in the chart about it. Thank you.

## 2019-08-26 NOTE — TELEPHONE ENCOUNTER
Schedule one has called pt 4 times to schedule her mri's she has NOT called them back to schedule

## 2019-08-26 NOTE — TELEPHONE ENCOUNTER
Please call patient  please ask her with the neurologist suggested    I had a consultation with neurology with her insurance company  The neurologist recommended we MRI her entire brain C-spine thoracic and lumbar with and without    If she agrees she should schedule this  But let me know what the neurologist suggested that she saw thank you

## 2019-08-30 NOTE — TELEPHONE ENCOUNTER
Spoke with patient the neurologist order MRI of Lumbar. I told her you spoke with a neurologist at the insurance company an they approve all 4 test. Patient states she will contact scheduling to get the test done.

## 2019-09-06 ENCOUNTER — TELEPHONE (OUTPATIENT)
Dept: FAMILY MEDICINE CLINIC | Facility: CLINIC | Age: 49
End: 2019-09-06

## 2019-09-06 NOTE — TELEPHONE ENCOUNTER
Patient will be having multiple tests within the next week and will like to have medication for her nerves. She will like it called into the pharmacy on file today if possible. She has a MRI scheduled for 9/7/19. She will also like for Sola to contact her for some questions. OK to leave Voice mail

## 2019-09-07 ENCOUNTER — HOSPITAL ENCOUNTER (OUTPATIENT)
Dept: MRI IMAGING | Facility: HOSPITAL | Age: 49
Discharge: HOME | End: 2019-09-07
Admitting: NURSE PRACTITIONER

## 2019-09-07 PROCEDURE — A9577 INJ MULTIHANCE: HCPCS | Performed by: NURSE PRACTITIONER

## 2019-09-07 PROCEDURE — 72158 MRI LUMBAR SPINE W/O & W/DYE: CPT

## 2019-09-07 PROCEDURE — 0 GADOBENATE DIMEGLUMINE 529 MG/ML SOLUTION: Performed by: NURSE PRACTITIONER

## 2019-09-07 RX ADMIN — GADOBENATE DIMEGLUMINE 18 ML: 529 INJECTION, SOLUTION INTRAVENOUS at 12:48

## 2019-09-11 ENCOUNTER — APPOINTMENT (OUTPATIENT)
Dept: MRI IMAGING | Facility: HOSPITAL | Age: 49
End: 2019-09-11

## 2019-09-11 ENCOUNTER — TELEPHONE (OUTPATIENT)
Dept: FAMILY MEDICINE CLINIC | Facility: CLINIC | Age: 49
End: 2019-09-11

## 2019-09-11 NOTE — TELEPHONE ENCOUNTER
Regarding: FW: Test Results Question  Contact: [phone]      ----- Message -----  From: Jacqueline Briceno  Sent: 9/11/2019   2:03 PM  To: [facility]  Subject: Test Results Question                            ----- Message from Mychart, Generic sent at 9/11/2019  2:03 PM EDT -----    Hi - Just an update on me.  I went to a new neurologist that specializes in MS, Dr. Nava.  She suggested I have the lumbar MRI but suggested I holdoff on the others since I had one in May.  She said I would need another in 4 months and she is concerned about insurance.  I'm still waiting on my lumbar MRI report that I had last Saturday.  Have you received it?  Thanks!      Oh and I had my EMG and NCT yesterday and doc said peripheral nerves are fine.  I started a 5-day IV infusion of steroids todays in hopes it helps to relieve the parasthesis.

## 2019-09-11 NOTE — TELEPHONE ENCOUNTER
Please tell patient I would like to speak with her tomorrow    MRI looks fine of the spine no evidence or suggestion of any MS lesions    Incidental finding that we need to discuss regarding a likely benign bone lesion not related  This is common do not worry but I may need a follow-up test will talk about it tomorrow

## 2019-09-12 ENCOUNTER — APPOINTMENT (OUTPATIENT)
Dept: MRI IMAGING | Facility: HOSPITAL | Age: 49
End: 2019-09-12

## 2019-09-13 DIAGNOSIS — M89.9 BONE LESION: Primary | ICD-10-CM

## 2019-09-13 DIAGNOSIS — R93.89 ABNORMAL MRI: ICD-10-CM

## 2019-10-07 ENCOUNTER — HOSPITAL ENCOUNTER (OUTPATIENT)
Dept: NUCLEAR MEDICINE | Facility: HOSPITAL | Age: 49
Discharge: HOME | End: 2019-10-07

## 2019-10-07 PROCEDURE — A9503 TC99M MEDRONATE: HCPCS | Performed by: NURSE PRACTITIONER

## 2019-10-07 PROCEDURE — 0 TECHNETIUM MEDRONATE KIT: Performed by: NURSE PRACTITIONER

## 2019-10-07 PROCEDURE — 78315 BONE IMAGING 3 PHASE: CPT

## 2019-10-07 RX ORDER — TC 99M MEDRONATE 20 MG/10ML
20.7 INJECTION, POWDER, LYOPHILIZED, FOR SOLUTION INTRAVENOUS
Status: COMPLETED | OUTPATIENT
Start: 2019-10-07 | End: 2019-10-07

## 2019-10-07 RX ADMIN — Medication 20.7 MILLICURIE: at 09:38

## 2020-01-23 ENCOUNTER — OFFICE VISIT (OUTPATIENT)
Dept: FAMILY MEDICINE CLINIC | Facility: CLINIC | Age: 50
End: 2020-01-23

## 2020-01-23 VITALS
OXYGEN SATURATION: 99 % | DIASTOLIC BLOOD PRESSURE: 68 MMHG | BODY MASS INDEX: 28.64 KG/M2 | HEIGHT: 68 IN | TEMPERATURE: 97.2 F | SYSTOLIC BLOOD PRESSURE: 104 MMHG | HEART RATE: 66 BPM | WEIGHT: 189 LBS

## 2020-01-23 DIAGNOSIS — M54.50 ACUTE LEFT-SIDED LOW BACK PAIN WITHOUT SCIATICA: Primary | ICD-10-CM

## 2020-01-23 DIAGNOSIS — F39 MOOD DISORDER (HCC): ICD-10-CM

## 2020-01-23 DIAGNOSIS — R35.0 URINARY FREQUENCY: ICD-10-CM

## 2020-01-23 DIAGNOSIS — R20.2 PARESTHESIA OF BILATERAL LEGS: ICD-10-CM

## 2020-01-23 DIAGNOSIS — Z12.4 PAP SMEAR FOR CERVICAL CANCER SCREENING: ICD-10-CM

## 2020-01-23 DIAGNOSIS — Z00.00 HEALTH MAINTENANCE EXAMINATION: ICD-10-CM

## 2020-01-23 DIAGNOSIS — G35 MS (MULTIPLE SCLEROSIS) (HCC): ICD-10-CM

## 2020-01-23 DIAGNOSIS — L65.9 ALOPECIA: ICD-10-CM

## 2020-01-23 PROCEDURE — 99214 OFFICE O/P EST MOD 30 MIN: CPT | Performed by: NURSE PRACTITIONER

## 2020-01-23 RX ORDER — OXYBUTYNIN CHLORIDE 5 MG/1
TABLET ORAL
COMMUNITY
Start: 2019-12-31 | End: 2020-08-05

## 2020-01-23 RX ORDER — DIMETHYL FUMARATE 240 MG/1
240 CAPSULE ORAL
COMMUNITY
Start: 2019-10-11 | End: 2020-08-05

## 2020-01-23 RX ORDER — FLUOXETINE 10 MG/1
10 CAPSULE ORAL DAILY
Qty: 30 CAPSULE | Refills: 1 | Status: SHIPPED | OUTPATIENT
Start: 2020-01-23 | End: 2020-02-10

## 2020-01-23 NOTE — PROGRESS NOTES
Subjective   Jacqueline Briceno is a 49 y.o. female.     Left low back pain intermittently over the last year more so after sitting  Does not radiate  Increases after bending over and coming up  No bowel or bladder change no weakness no groin paresthesias  History of MS sees Dr. Nava  I had ordered MRI of entire spine but I believe she only required the lumbar  EMG suggestive of MS according to neurology per patient  She did not believe the back was related likely    She does have some urinary complaints frequency  Presently no fever chills symptoms been ongoing and she is been referred to urogynecology by  Neurology    History mood disorder depression anxiety as taken Lamictal for many years generally doing well but she has more social anxiety requesting to begin out of medication  Has not seen psychiatry in some time and recently neurology had written her a transitional prescription  She is willing to go back no hospitalizations  No history of psychosis presently no severe depression suicidal ideation    Quite a bit of stress job change  Over the last year  History divorce  17-year-old      She is noticed hair is thinning a bit over the last year but no patches of hair loss      Alopecia   Associated symptoms include numbness and weakness. Pertinent negatives include no abdominal pain, chest pain, chills, coughing, fatigue, fever or headaches.   Back Pain   This is a recurrent problem. The current episode started more than 1 month ago. The problem occurs daily. The problem has been gradually worsening since onset. The pain is present in the sacro-iliac. The quality of the pain is described as aching. The pain does not radiate. The pain is at a severity of 5/10. The pain is worse during the day. The symptoms are aggravated by bending. Associated symptoms include bladder incontinence, leg pain, numbness, paresthesias, tingling and weakness. Pertinent negatives include no abdominal pain, bowel incontinence, chest pain,  "dysuria, fever, headaches, paresis, pelvic pain, perianal numbness or weight loss. Risk factors include lack of exercise, obesity, poor posture and sedentary lifestyle.        /68   Pulse 66   Temp 97.2 °F (36.2 °C)   Ht 172.7 cm (67.99\")   Wt 85.7 kg (189 lb)   SpO2 99%   BMI 28.74 kg/m²       The following portions of the patient's history were reviewed and updated as appropriate: allergies, current medications, past family history, past medical history, past social history, past surgical history and problem list.    Review of Systems   Constitutional: Negative for chills, fatigue, fever and unexpected weight loss.   HENT: Negative.  Negative for trouble swallowing.    Eyes: Negative.    Respiratory: Negative for cough and shortness of breath.    Cardiovascular: Negative for chest pain, palpitations and leg swelling.   Gastrointestinal: Negative for abdominal pain, blood in stool and bowel incontinence.   Genitourinary: Positive for urinary incontinence. Negative for dysuria and pelvic pain.   Musculoskeletal: Positive for back pain.   Neurological: Positive for tingling, weakness, numbness and paresthesias. Negative for dizziness, speech difficulty and confusion.   Psychiatric/Behavioral: Negative.        Objective   Physical Exam   Constitutional: She is oriented to person, place, and time. She appears well-developed and well-nourished.   HENT:   Head: Normocephalic.   Mouth/Throat: Oropharynx is clear and moist. No oropharyngeal exudate.   Eyes: Pupils are equal, round, and reactive to light. Conjunctivae are normal.   Neck: Neck supple. No JVD present. No thyromegaly present.   Cardiovascular: Normal rate, regular rhythm and normal heart sounds. Exam reveals no friction rub.   No murmur heard.  Pulmonary/Chest: Effort normal and breath sounds normal. No respiratory distress. She has no wheezes.   Abdominal: Soft. Bowel sounds are normal. She exhibits no distension and no mass. There is no " tenderness. There is no guarding. No hernia.   Musculoskeletal: She exhibits no edema or tenderness.   Negative straight leg raise plantar flexion dorsiflexion normal  No lower lumbar tenderness   Lymphadenopathy:     She has no cervical adenopathy.   Neurological: She is alert and oriented to person, place, and time. She displays normal reflexes. No cranial nerve deficit. Coordination normal.   Skin: Skin is warm and dry.   Psychiatric: She has a normal mood and affect. Her behavior is normal. Judgment and thought content normal.   Vitals reviewed.        Assessment/Plan   Jacqueline was seen today for alopecia and back pain.    Diagnoses and all orders for this visit:    Acute left-sided low back pain without sciatica  -     Ferritin  -     TSH Rfx On Abnormal To Free T4  -     Urinalysis With Culture If Indicated -    Paresthesia of bilateral legs  -     Ferritin  -     TSH Rfx On Abnormal To Free T4  -     Urinalysis With Culture If Indicated -    MS (multiple sclerosis) (CMS/HCC)  -     Ferritin  -     TSH Rfx On Abnormal To Free T4  -     Urinalysis With Culture If Indicated -    Mood disorder (CMS/HCC)  -     Ferritin  -     TSH Rfx On Abnormal To Free T4  -     Urinalysis With Culture If Indicated -    Alopecia  Comments:  Subjective diffuse normal exam  Orders:  -     Ferritin  -     TSH Rfx On Abnormal To Free T4  -     Urinalysis With Culture If Indicated -    Health maintenance examination  -     Ferritin  -     TSH Rfx On Abnormal To Free T4  -     Urinalysis With Culture If Indicated -    Pap smear for cervical cancer screening  -     Ambulatory Referral to Gynecology  -     Urinalysis With Culture If Indicated -    Urinary frequency  -     Urinalysis With Culture If Indicated -    Other orders  -     FLUoxetine (PROZAC) 10 MG capsule; Take 1 capsule by mouth Daily.    Left low back pain  Without sciatica  Exercises NSAID trial follow-up 6 weeks  She will need to see psychiatry but during the transition  will start fluoxetine slowly as below risk-benefit discussed  Labs today  Full head of hair if she has any further issues with her hair see dermatology call for referral  Multivitamin  Office visit 45 minutes                Patient Instructions         Discharge instructions  Exercises most days of the week  Improve ergonomics  Alternate sit to stand hourly  Improve monitor positioning  Neck exercises daily including neck stretches  Aviacomm  Working on posture daily  Upper posterior cervical region as well    Follow-up neurology    Aleve 2 tablets twice daily with food and water for 3 to 6 weeks  Follow-up in the office in 6 weeks to recheck depression anxiety as well as your back    Severe pain weakness groin paresthesias bowel or bladder incontinence or severe retention emergency room  Follow-up with urogynecology      GYN    Fluoxetine 10 mg  Every other day x1 to 2 weeks then daily 1 to 2 weeks then call for dosing increase  Likely will need 20 to 40 mg daily  Continue Lamictal  Severe depression suicidal ideation ER        1200-to 1400 isac mostly vegetables chicken fish  64 ounces water day mostly vegetables high-fiber    Greatly decrease breads and pasta sauces etc. which drive appetite and promote obesity      Back Exercises  The following exercises strengthen the muscles that help to support the back. They also help to keep the lower back flexible. Doing these exercises can help to prevent back pain or lessen existing pain.  If you have back pain or discomfort, try doing these exercises 2-3 times each day or as told by your health care provider. When the pain goes away, do them once each day, but increase the number of times that you repeat the steps for each exercise (do more repetitions). If you do not have back pain or discomfort, do these exercises once each day or as told by your health care provider.  Exercises  Single Knee to Chest  Repeat these steps 3-5 times for each le. Lie on your  back on a firm bed or the floor with your legs extended.  2. Bring one knee to your chest. Your other leg should stay extended and in contact with the floor.  3. Hold your knee in place by grabbing your knee or thigh.  4. Pull on your knee until you feel a gentle stretch in your lower back.  5. Hold the stretch for 10-30 seconds.  6. Slowly release and straighten your leg.  Pelvic Tilt  Repeat these steps 5-10 times:  1. Lie on your back on a firm bed or the floor with your legs extended.  2. Bend your knees so they are pointing toward the ceiling and your feet are flat on the floor.  3. Tighten your lower abdominal muscles to press your lower back against the floor. This motion will tilt your pelvis so your tailbone points up toward the ceiling instead of pointing to your feet or the floor.  4. With gentle tension and even breathing, hold this position for 5-10 seconds.  Cat-Cow  Repeat these steps until your lower back becomes more flexible:  1. Get into a hands-and-knees position on a firm surface. Keep your hands under your shoulders, and keep your knees under your hips. You may place padding under your knees for comfort.  2. Let your head hang down, and point your tailbone toward the floor so your lower back becomes rounded like the back of a cat.  3. Hold this position for 5 seconds.  4. Slowly lift your head and point your tailbone up toward the ceiling so your back forms a sagging arch like the back of a cow.  5. Hold this position for 5 seconds.    Press-Ups  Repeat these steps 5-10 times:  1. Lie on your abdomen (face-down) on the floor.  2. Place your palms near your head, about shoulder-width apart.  3. While you keep your back as relaxed as possible and keep your hips on the floor, slowly straighten your arms to raise the top half of your body and lift your shoulders. Do not use your back muscles to raise your upper torso. You may adjust the placement of your hands to make yourself more  comfortable.  4. Hold this position for 5 seconds while you keep your back relaxed.  5. Slowly return to lying flat on the floor.    Bridges  Repeat these steps 10 times:  1. Lie on your back on a firm surface.  2. Bend your knees so they are pointing toward the ceiling and your feet are flat on the floor.  3. Tighten your buttocks muscles and lift your buttocks off of the floor until your waist is at almost the same height as your knees. You should feel the muscles working in your buttocks and the back of your thighs. If you do not feel these muscles, slide your feet 1-2 inches farther away from your buttocks.  4. Hold this position for 3-5 seconds.  5. Slowly lower your hips to the starting position, and allow your buttocks muscles to relax completely.  If this exercise is too easy, try doing it with your arms crossed over your chest.  Abdominal Crunches  Repeat these steps 5-10 times:  1. Lie on your back on a firm bed or the floor with your legs extended.  2. Bend your knees so they are pointing toward the ceiling and your feet are flat on the floor.  3. Cross your arms over your chest.  4. Tip your chin slightly toward your chest without bending your neck.  5. Tighten your abdominal muscles and slowly raise your trunk (torso) high enough to lift your shoulder blades a tiny bit off of the floor. Avoid raising your torso higher than that, because it can put too much stress on your low back and it does not help to strengthen your abdominal muscles.  6. Slowly return to your starting position.  Back Lifts  Repeat these steps 5-10 times:  1. Lie on your abdomen (face-down) with your arms at your sides, and rest your forehead on the floor.  2. Tighten the muscles in your legs and your buttocks.  3. Slowly lift your chest off of the floor while you keep your hips pressed to the floor. Keep the back of your head in line with the curve in your back. Your eyes should be looking at the floor.  4. Hold this position for  3-5 seconds.  5. Slowly return to your starting position.  Contact a health care provider if:  · Your back pain or discomfort gets much worse when you do an exercise.  · Your back pain or discomfort does not lessen within 2 hours after you exercise.  If you have any of these problems, stop doing these exercises right away. Do not do them again unless your health care provider says that you can.  Get help right away if:  · You develop sudden, severe back pain. If this happens, stop doing the exercises right away. Do not do them again unless your health care provider says that you can.  This information is not intended to replace advice given to you by your health care provider. Make sure you discuss any questions you have with your health care provider.  Document Released: 01/25/2006 Document Revised: 04/23/2019 Document Reviewed: 02/11/2016  [employer] Interactive Patient Education © 2019 [employer]

## 2020-01-23 NOTE — PATIENT INSTRUCTIONS
Discharge instructions  Exercises most days of the week  Improve ergonomics  Alternate sit to stand hourly  Improve monitor positioning  Neck exercises daily including neck stretches  Insight[employer]  Working on posture daily  Upper posterior cervical region as well    Follow-up neurology    Aleve 2 tablets twice daily with food and water for 3 to 6 weeks  Follow-up in the office in 6 weeks to recheck depression anxiety as well as your back    Severe pain weakness groin paresthesias bowel or bladder incontinence or severe retention emergency room  Follow-up with urogynecology      GYN    Fluoxetine 10 mg  Every other day x1 to 2 weeks then daily 1 to 2 weeks then call for dosing increase  Likely will need 20 to 40 mg daily  Continue Lamictal  Severe depression suicidal ideation ER        1200-to 1400 isac mostly vegetables chicken fish  64 ounces water day mostly vegetables high-fiber    Greatly decrease breads and pasta sauces etc. which drive appetite and promote obesity      Back Exercises  The following exercises strengthen the muscles that help to support the back. They also help to keep the lower back flexible. Doing these exercises can help to prevent back pain or lessen existing pain.  If you have back pain or discomfort, try doing these exercises 2-3 times each day or as told by your health care provider. When the pain goes away, do them once each day, but increase the number of times that you repeat the steps for each exercise (do more repetitions). If you do not have back pain or discomfort, do these exercises once each day or as told by your health care provider.  Exercises  Single Knee to Chest  Repeat these steps 3-5 times for each le. Lie on your back on a firm bed or the floor with your legs extended.  2. Bring one knee to your chest. Your other leg should stay extended and in contact with the floor.  3. Hold your knee in place by grabbing your knee or thigh.  4. Pull on your knee until  you feel a gentle stretch in your lower back.  5. Hold the stretch for 10-30 seconds.  6. Slowly release and straighten your leg.  Pelvic Tilt  Repeat these steps 5-10 times:  1. Lie on your back on a firm bed or the floor with your legs extended.  2. Bend your knees so they are pointing toward the ceiling and your feet are flat on the floor.  3. Tighten your lower abdominal muscles to press your lower back against the floor. This motion will tilt your pelvis so your tailbone points up toward the ceiling instead of pointing to your feet or the floor.  4. With gentle tension and even breathing, hold this position for 5-10 seconds.  Cat-Cow  Repeat these steps until your lower back becomes more flexible:  1. Get into a hands-and-knees position on a firm surface. Keep your hands under your shoulders, and keep your knees under your hips. You may place padding under your knees for comfort.  2. Let your head hang down, and point your tailbone toward the floor so your lower back becomes rounded like the back of a cat.  3. Hold this position for 5 seconds.  4. Slowly lift your head and point your tailbone up toward the ceiling so your back forms a sagging arch like the back of a cow.  5. Hold this position for 5 seconds.    Press-Ups  Repeat these steps 5-10 times:  1. Lie on your abdomen (face-down) on the floor.  2. Place your palms near your head, about shoulder-width apart.  3. While you keep your back as relaxed as possible and keep your hips on the floor, slowly straighten your arms to raise the top half of your body and lift your shoulders. Do not use your back muscles to raise your upper torso. You may adjust the placement of your hands to make yourself more comfortable.  4. Hold this position for 5 seconds while you keep your back relaxed.  5. Slowly return to lying flat on the floor.    Bridges  Repeat these steps 10 times:  1. Lie on your back on a firm surface.  2. Bend your knees so they are pointing toward the  ceiling and your feet are flat on the floor.  3. Tighten your buttocks muscles and lift your buttocks off of the floor until your waist is at almost the same height as your knees. You should feel the muscles working in your buttocks and the back of your thighs. If you do not feel these muscles, slide your feet 1-2 inches farther away from your buttocks.  4. Hold this position for 3-5 seconds.  5. Slowly lower your hips to the starting position, and allow your buttocks muscles to relax completely.  If this exercise is too easy, try doing it with your arms crossed over your chest.  Abdominal Crunches  Repeat these steps 5-10 times:  1. Lie on your back on a firm bed or the floor with your legs extended.  2. Bend your knees so they are pointing toward the ceiling and your feet are flat on the floor.  3. Cross your arms over your chest.  4. Tip your chin slightly toward your chest without bending your neck.  5. Tighten your abdominal muscles and slowly raise your trunk (torso) high enough to lift your shoulder blades a tiny bit off of the floor. Avoid raising your torso higher than that, because it can put too much stress on your low back and it does not help to strengthen your abdominal muscles.  6. Slowly return to your starting position.  Back Lifts  Repeat these steps 5-10 times:  1. Lie on your abdomen (face-down) with your arms at your sides, and rest your forehead on the floor.  2. Tighten the muscles in your legs and your buttocks.  3. Slowly lift your chest off of the floor while you keep your hips pressed to the floor. Keep the back of your head in line with the curve in your back. Your eyes should be looking at the floor.  4. Hold this position for 3-5 seconds.  5. Slowly return to your starting position.  Contact a health care provider if:  · Your back pain or discomfort gets much worse when you do an exercise.  · Your back pain or discomfort does not lessen within 2 hours after you exercise.  If you have  any of these problems, stop doing these exercises right away. Do not do them again unless your health care provider says that you can.  Get help right away if:  · You develop sudden, severe back pain. If this happens, stop doing the exercises right away. Do not do them again unless your health care provider says that you can.  This information is not intended to replace advice given to you by your health care provider. Make sure you discuss any questions you have with your health care provider.  Document Released: 01/25/2006 Document Revised: 04/23/2019 Document Reviewed: 02/11/2016  Elsevier Interactive Patient Education © 2019 [employer]

## 2020-01-24 LAB
APPEARANCE UR: CLEAR
BACTERIA #/AREA URNS HPF: NORMAL /HPF
BILIRUB UR QL STRIP: NEGATIVE
COLOR UR: YELLOW
EPI CELLS #/AREA URNS HPF: NORMAL /HPF (ref 0–10)
FERRITIN SERPL-MCNC: 116 NG/ML (ref 13–150)
GLUCOSE UR QL: NEGATIVE
HGB UR QL STRIP: NEGATIVE
KETONES UR QL STRIP: ABNORMAL
LEUKOCYTE ESTERASE UR QL STRIP: NEGATIVE
MICRO URNS: ABNORMAL
MICRO URNS: ABNORMAL
NITRITE UR QL STRIP: NEGATIVE
PH UR STRIP: 5.5 [PH] (ref 5–7.5)
PROT UR QL STRIP: NEGATIVE
RBC #/AREA URNS HPF: NORMAL /HPF (ref 0–2)
SP GR UR: 1.01 (ref 1–1.03)
TSH SERPL DL<=0.005 MIU/L-ACNC: 1.41 UIU/ML (ref 0.27–4.2)
URINALYSIS REFLEX: ABNORMAL
UROBILINOGEN UR STRIP-MCNC: 0.2 MG/DL (ref 0.2–1)
WBC #/AREA URNS HPF: NORMAL /HPF (ref 0–5)

## 2020-01-30 ENCOUNTER — TELEPHONE (OUTPATIENT)
Dept: FAMILY MEDICINE CLINIC | Facility: CLINIC | Age: 50
End: 2020-01-30

## 2020-01-30 NOTE — TELEPHONE ENCOUNTER
Office visit 2 confirm it is likely a fungus as well as to discussed risk and benefit of treatment which requires 3 months of an antifungal to treat a toenail  Thank you

## 2020-01-30 NOTE — TELEPHONE ENCOUNTER
----- Message from Dennies Garrick, MA sent at 1/30/2020 10:57 AM EST -----  Regarding: FW: Non-Urgent Medical Question  Contact: [phone]      ----- Message -----  From: Jacqueline Briceno  Sent: 1/30/2020  10:54 AM EST  To: [facility]  Subject: Non-Urgent Medical Question                      Hi -  I think I have a toe fungus possibly.  ??  I went for a pedicure and the thickness of the nail of my big toe on my right foot it thicker than the other and the color is yellowish.  I'm sure it takes a long time to get into see a dermotologist as a new patient so is there something that you will/can prescribe please?  I don't one a grandpa toe!  Help!  :)

## 2020-02-10 RX ORDER — FLUOXETINE HYDROCHLORIDE 20 MG/1
20 CAPSULE ORAL DAILY
Qty: 30 CAPSULE | Refills: 1 | Status: SHIPPED | OUTPATIENT
Start: 2020-02-10 | End: 2020-04-08

## 2020-02-12 NOTE — TELEPHONE ENCOUNTER
----- Message from Amber Uriostegui sent at 2/12/2020  8:43 AM CST -----  Contact: Patient [phone]  Type: Patient Call Back    Who called: Patient    What is the request in detail: Patient states that an Inhaler was supposed to be called in to pharmacy on yesterday, 02-11-20. When he called [facility] he was told that nothing was called in. Please call pt in regards to this.   .  [id number] - [place] - [address] AT [facility] [address]  [facility]  [place] [address]  Phone: [phone] Fax: [phone]    Would the patient rather a call back or a response via [facility]? Call back    Best call back number: [phone]     LM for PT to contact our office

## 2020-03-23 ENCOUNTER — TELEPHONE (OUTPATIENT)
Dept: FAMILY MEDICINE CLINIC | Facility: CLINIC | Age: 50
End: 2020-03-23

## 2020-03-23 DIAGNOSIS — L98.9 SKIN LESION OF BACK: Primary | ICD-10-CM

## 2020-03-23 NOTE — TELEPHONE ENCOUNTER
----- Message from Barbara Williamson MA sent at 3/23/2020  8:28 AM EDT -----  Regarding: FW: Referral Request  Contact: [phone]  Pt asks for Derm ref for non healing spot on back.     ----- Message -----  From: Jacqueline Briceno  Sent: 3/20/2020   8:32 PM EDT  To: [facility]  Subject: Referral Request                                 Hi - I have this spot on my back that won't heal and it's worrying me.  Can you refer me to a dermatologist please?

## 2020-04-08 RX ORDER — FLUOXETINE HYDROCHLORIDE 20 MG/1
CAPSULE ORAL
Qty: 30 CAPSULE | Refills: 0 | Status: SHIPPED | OUTPATIENT
Start: 2020-04-08 | End: 2020-06-17

## 2020-05-26 DIAGNOSIS — G89.29 CHRONIC PAIN OF BOTH KNEES: Primary | ICD-10-CM

## 2020-05-26 DIAGNOSIS — M25.562 CHRONIC PAIN OF BOTH KNEES: Primary | ICD-10-CM

## 2020-05-26 DIAGNOSIS — M25.561 CHRONIC PAIN OF BOTH KNEES: Primary | ICD-10-CM

## 2020-06-17 ENCOUNTER — OFFICE VISIT (OUTPATIENT)
Dept: SPORTS MEDICINE | Facility: CLINIC | Age: 50
End: 2020-06-17

## 2020-06-17 VITALS
TEMPERATURE: 97 F | BODY MASS INDEX: 28.64 KG/M2 | DIASTOLIC BLOOD PRESSURE: 74 MMHG | OXYGEN SATURATION: 99 % | SYSTOLIC BLOOD PRESSURE: 124 MMHG | WEIGHT: 189 LBS | HEIGHT: 68 IN | HEART RATE: 64 BPM

## 2020-06-17 DIAGNOSIS — M25.562 CHRONIC PAIN OF LEFT KNEE: Primary | ICD-10-CM

## 2020-06-17 DIAGNOSIS — M22.2X2 PATELLOFEMORAL PAIN SYNDROME OF LEFT KNEE: ICD-10-CM

## 2020-06-17 DIAGNOSIS — G89.29 CHRONIC PAIN OF LEFT KNEE: Primary | ICD-10-CM

## 2020-06-17 PROCEDURE — 73562 X-RAY EXAM OF KNEE 3: CPT | Performed by: FAMILY MEDICINE

## 2020-06-17 PROCEDURE — 99244 OFF/OP CNSLTJ NEW/EST MOD 40: CPT | Performed by: FAMILY MEDICINE

## 2020-06-17 RX ORDER — FLUOXETINE HYDROCHLORIDE 20 MG/1
CAPSULE ORAL
Qty: 30 CAPSULE | Refills: 2 | Status: SHIPPED | OUTPATIENT
Start: 2020-06-17 | End: 2020-08-05

## 2020-06-17 NOTE — PROGRESS NOTES
"Chief Complaint   Patient presents with   • Knee Pain     LT knee pain - referral from James Epley, APRN - pain present for about 1 year, patient reports possible injury but isn't sure, does current have MS, hasn't really had mobility issues - last year decided to start exercising which developed knee pain and numbness below the knee radiating down to the foot - has previously seen a neurologist and orthopedist, dx with runners knee - did previously have an EMG/NCS which returned normal - here for second opinion, with new x-rays, for further evaluation    • Knee Pain     Patient would like to start exercising more, but reports having weakness and difficulty walking - did previously try PT with no relief - isn't sure if this is MS related         History of Present Illness  Has had ongoing intermittent left knee pain for 1 year.  No known injury.  History of multiple sclerosis.  Has had EMG study performed of the left lower extremity which was normal.  Has been seen by outside orthopedist for same complaint.  She was working with physical therapy last year but not recently for same complaint.  No mechanical symptoms.  Pain seems to be worse after activity such as squatting.  She would like to return to moderate activity but feels limited due to the knee pain.  Intermittently has taken over-the-counter anti-inflammatory.    I have reviewed the patient's medical, family, and social history in detail and updated the computerized patient record.    Review of Systems  Constitutional: Negative for fever.   Musculoskeletal:        Per HPI   Skin: Negative for rash.   Neurological: Negative for weakness and numbness.   Psychiatric/Behavioral: Negative for sleep disturbance.   All other systems reviewed and are negative.    /74 (BP Location: Right arm, Patient Position: Sitting, Cuff Size: Adult)   Pulse 64   Temp 97 °F (36.1 °C)   Ht 172.7 cm (67.99\")   Wt 85.7 kg (189 lb)   SpO2 99%   BMI 28.74 kg/m² "       Physical Exam    Vital signs reviewed.   General: No acute distress.  Eyes: conjunctiva clear; pupils equally round and reactive  ENT: external ears and nose atraumatic; oropharynx clear  CV: no peripheral edema, 2+ distal pulses  Resp: normal respiratory effort, no use of accessory muscles  Skin: no rashes or wounds; normal turgor  Psych: mood and affect appropriate; recent and remote memory intact  Neuro: sensation to light touch intact    MSK Exam:  Left knee: No effusion.  Full range of motion.  Negative anterior drawer, anterior Lachman.  Negative medial, lateral Jake.  No varus or valgus laxity.    Left Knee X-Ray  Indication: Pain    Views: AP, lateral, sunrise    Findings:  No fracture  No bony lesion  Normal soft tissues  Normal joint spaces    No prior studies were available for comparison.    Jacqueline was seen today for knee pain and knee pain.    Diagnoses and all orders for this visit:    Chronic pain of left knee  -     XR Knee 3+ View With Sunrise Left; Future  -     XR Knee 3+ View With Sunrise Left  -     Diclofenac Sodium (Pennsaid) 2 % solution; Place 2 Act on the skin as directed by provider 2 (Two) Times a Day.  -     Ambulatory Referral to Physical Therapy    Patellofemoral pain syndrome of left knee  -     Diclofenac Sodium (Pennsaid) 2 % solution; Place 2 Act on the skin as directed by provider 2 (Two) Times a Day.  -     Ambulatory Referral to Physical Therapy        Symptoms are more consistent with patellofemoral pain syndrome.  No structural abnormality noted on knee x-ray.  Not having true mechanical symptoms.  She can advance activity as tolerated.  I am recommending physical therapy to help with medial knee stabilizers.  Sent pen said for which she can apply up to twice daily as needed.  If still having significant symptoms weeks from now, consider MRI.    EMR Dragon/Transcription disclaimer:    Much of this encounter note is an electronic transcription/translation of spoken  language to printed text.  The electronic translation of spoken language may permit erroneous, or at times, nonsensical words or phrases to be inadvertently transcribed.  Although I have reviewed the note for such errors some may still exist.

## 2020-06-29 ENCOUNTER — TREATMENT (OUTPATIENT)
Dept: PHYSICAL THERAPY | Facility: CLINIC | Age: 50
End: 2020-06-29

## 2020-06-29 DIAGNOSIS — M22.2X2 PATELLOFEMORAL PAIN SYNDROME OF LEFT KNEE: ICD-10-CM

## 2020-06-29 DIAGNOSIS — R26.2 DIFFICULTY WALKING: ICD-10-CM

## 2020-06-29 DIAGNOSIS — M25.562 CHRONIC PAIN OF LEFT KNEE: Primary | ICD-10-CM

## 2020-06-29 DIAGNOSIS — G89.29 CHRONIC PAIN OF LEFT KNEE: Primary | ICD-10-CM

## 2020-06-29 PROCEDURE — 97110 THERAPEUTIC EXERCISES: CPT | Performed by: PHYSICAL THERAPIST

## 2020-06-29 PROCEDURE — 97162 PT EVAL MOD COMPLEX 30 MIN: CPT | Performed by: PHYSICAL THERAPIST

## 2020-06-29 NOTE — PROGRESS NOTES
Physical Therapy Initial Evaluation and Plan of Care      Patient: Jacqueline Briceno   : 1970  Diagnosis/ICD-10 Code:  Chronic pain of left knee [M25.562, G89.29]  Referring practitioner: Saw Whitney *  Date of Initial Visit: 2020  Today's Date: 2020  Patient seen for 1 sessions           Subjective Evaluation    History of Present Illness  Date of onset: 2019  Mechanism of injury: Pt downloaded a 30 day fitness lilia about a year ago. She started having L knee pain. She does have MS, was under a lot of stress at that time, but the video did have a lot of squats. She did do some therapy, but it didn't help. She really wants to be more active, MS is more under control now. Lives in [facility] and laundry in basement. Knee feels weak, using handrail even going up. Member at [employer]      Patient Occupation: realtor Quality of life: good    Pain  Current pain ratin (soreness)  At worst pain rating: 3  Location: L knee  Quality: dull ache (some numbness, weak)  Aggravating factors: squatting, ambulation and stairs (kneeling)    Social Support  Lives in: multiple-level home  Lives with: significant other (16 year old)    Treatments  Previous treatment: physical therapy  Current treatment: medication  Patient Goals  Patient goals for therapy: decreased pain, increased strength and return to sport/leisure activities  Patient goal: walking/hiking           Objective          Static Posture     Ankle/Foot   Ankle/Foot (Left): Pes planus.   Ankle/Foot (Right): Pes planus.     Comments  Pt can hold an arch in static standing, but collapses with walking    Tenderness   Left Knee   Tenderness in the medial joint line and medial patella.     Neurological Testing     Sensation     Knee   Left Knee   Intact: light touch  Diminished: light touch     Right Knee   Intact: light touch     Comments   Left light touch: diminished very mildly below the knee.     Active Range of Motion   Left Knee   Flexion:  120 degrees   Extension: 0 degrees     Right Knee   Flexion: 125 degrees   Extension: 0 degrees     Patellar Mobility   Left Knee Hypomobile in the left medial patellar tendon(s).     Right Knee Hypomobile in the medial patellar tendon(s).     Strength/Myotome Testing     Left Hip   Planes of Motion   Flexion: 4  Abduction: 4-  Adduction: 4+  External rotation: 4-    Right Hip   Planes of Motion   Flexion: 4+  Abduction: 4+  Adduction: 5  External rotation: 4+    Left Knee   Flexion: 4-  Extension: 4    Right Knee   Flexion: 4  Extension: 4+    Functional Assessment     Single Leg Stance   Left: 6 seconds  Right: 5 seconds        See Exercise, Manual, and Modality Logs for complete treatment.     Functional outcome score: Knee Outcome Survey=56.25%    Exercises:  -quad set 10x 5 sec  -SLR and SL hip abd 2x10  -bridge with ball 2x10  -Hip abd/ER green band 2x10        Assessment & Plan     Assessment  Impairments: abnormal gait, abnormal or restricted ROM, activity intolerance, impaired physical strength, pain with function and weight-bearing intolerance  Assessment details: Jacqueline Briceno is a 49 y.o. year-old female referred to physical therapy for left knee pain. She presents with a evolving clinical presentation.  She has comorbidities of MS and no known personal factors that may affect her progress in the plan of care.  Pt has mild decreased ROM in ext=0 deg and flex=120 deg, decreased hip and knee strength 4-/5, and tenderness over the medial joint line.  Pt would benefit from therapy to help improve her ability to walk, perform stairs, and knee/squat with greater ease.   Functional Limitations: sleeping, walking, sitting, standing and unable to perform repetitive tasks  Goals  Plan Goals: ST. Patient will be independent with education for symptom management, joint protection and strategies to minimize stress on affected tissues  2. Patient will improve pain complaints to no more than 1/10 with  ADLs    LT. Pt to improve knee ROM to rhxgqql=539 deg for greater ease on stairs   2. Pt to improve score on Knee Outcome Survey from 56.25% to 70% for overall functional improvement  3. Patient will increase knee and hip strength to 4+/5 to improve functional mobility  4. Patient will negotiate stairs reciprocally with rail support as needed  5. Patient will demonstrate an independent HEP for core and knee strength and flexibility/ROM.        Plan  Therapy options: will be seen for skilled physical therapy services  Planned modality interventions: cryotherapy, ultrasound and TENS  Planned therapy interventions: ADL retraining, balance/weight-bearing training, flexibility, functional ROM exercises, gait training, home exercise program, IADL retraining, joint mobilization, manual therapy, motor coordination training, neuromuscular re-education, soft tissue mobilization, strengthening, stretching, therapeutic activities, transfer training and orthotic fitting/training  Frequency: 1x week  Duration in weeks: 20  Treatment plan discussed with: patient  Plan details: Pt to contact MD about a referral for custom orthotics        Timed:  Manual Therapy:         mins  09564;  Therapeutic Exercise:    15     mins  12579;     Neuromuscular Yonas:        mins  45729;    Therapeutic Activity:          mins  01740;     Gait Training:           mins  40655;     Ultrasound:          mins  95390;    Electrical Stimulation:         mins  83389 ( );  Iontophoresis         mins 26580  Dry Needling        mins      Untimed:  Electrical Stimulation:         mins  41111 ( );  Mechanical Traction:         mins  68833;     Timed Treatment:   15   mins   Total Treatment:     45   mins    PT SIGNATURE: Briana Coe, PT   DATE TREATMENT INITIATED: 2020    Initial Certification  Certification Period: 2020  I certify that the therapy services are furnished while this patient is under my care.  The services  outlined above are required by this patient, and will be reviewed every 90 days.     PHYSICIAN: Saw Whitney Jr., DO      DATE:     Please sign and return via fax to  .. Thank you, [facility].

## 2020-07-09 ENCOUNTER — TREATMENT (OUTPATIENT)
Dept: PHYSICAL THERAPY | Facility: CLINIC | Age: 50
End: 2020-07-09

## 2020-07-09 DIAGNOSIS — G89.29 CHRONIC PAIN OF LEFT KNEE: Primary | ICD-10-CM

## 2020-07-09 DIAGNOSIS — M22.2X2 PATELLOFEMORAL PAIN SYNDROME OF LEFT KNEE: ICD-10-CM

## 2020-07-09 DIAGNOSIS — M25.562 CHRONIC PAIN OF LEFT KNEE: Primary | ICD-10-CM

## 2020-07-09 DIAGNOSIS — R26.2 DIFFICULTY WALKING: ICD-10-CM

## 2020-07-09 PROCEDURE — 97110 THERAPEUTIC EXERCISES: CPT | Performed by: PHYSICAL THERAPIST

## 2020-07-09 NOTE — PROGRESS NOTES
Physical Therapy Daily Progress Note    Patient: Jacqueline Briceno   : 1970  Diagnosis/ICD-10 Code:  Chronic pain of left knee [M25.562, G89.29]  Referring practitioner: Saw Whitney *  Date of Initial Visit: Type: THERAPY  Noted: 2020  Today's Date: 2020  Patient seen for 2 sessions           Subjective I have been less afraid to so the stairs    Objective   See Exercise, Manual, and Modality Logs for complete treatment.     Exercises:  -ab brace 2x10 for 5 sec hold  -quad set 2x10 for 5 sec  -SLR and SL hip abd 2x10  -bridge with ball 2x10  -Hip abd/ER green band 2x10, B and single leg  -Preston single leg press 120lb 3x10 each  -Preston hip abd 40lb 3x10  -Preston hip add 75lb 3x10  -4 in lateral step ups x20 B      Assessment/Plan  Review of HEP. Cues for holding quad set with SLR, still with small lag when lowering. Addition of gym machines for hip and LE strengthening. Education on activation of abs with exercises         Timed:    Manual Therapy:         mins  43049;  Therapeutic Exercise:    45     mins  57344;     Neuromuscular Yonas:        mins  68126;    Therapeutic Activity:          mins  53165;     Gait Training:           mins  74488;     Ultrasound:          mins  83787;    Electrical Stimulation:         mins  65626 ( );  Iontophoresis         mins 36291;  Aquatic Therapy         mins 31660;  Dry Needling                   mins    Untimed:  Electrical Stimulation:         mins  30711 ( );  Mechanical Traction:         mins  25047;     Timed Treatment:   45   mins   Total Treatment:     45   mins  Briana Coe, PT  Physical Therapist

## 2020-07-16 ENCOUNTER — TELEPHONE (OUTPATIENT)
Dept: FAMILY MEDICINE CLINIC | Facility: CLINIC | Age: 50
End: 2020-07-16

## 2020-07-16 NOTE — TELEPHONE ENCOUNTER
----- Message from Rosamaria Maciel MA sent at 7/16/2020  3:28 PM EDT -----  Regarding: FW: Non-Urgent Medical Question  Contact: [phone]      ----- Message -----  From: Jacqueline Briceno  Sent: 7/16/2020  12:44 PM EDT  To: [facility]  Subject: Non-Urgent Medical Question                      Fabian Tracy -  So my right ear is hurting but it's more of an outer ear or maybe even middle ear deal… At first I thought maybe I slept on it wrong but it's been hurting for a couple days now.  I was going to try home remedies first… Can you suggest anything over the counter? Ibuprofen didn't seem to help anything.  I kind of feel like it started after I wore ear buds so I'm wondering if maybe it's bacterial.  Thanks!

## 2020-07-16 NOTE — TELEPHONE ENCOUNTER
----- Message from Rosamaria Maciel MA sent at 7/16/2020  3:28 PM EDT -----  Regarding: FW: Non-Urgent Medical Question  Contact: [phone]      ----- Message -----  From: Jacqueline Briceno  Sent: 7/16/2020  12:44 PM EDT  To: [facility]  Subject: Non-Urgent Medical Question                      Fabian Tracy -  So my right ear is hurting but it's more of an outer ear or maybe even middle ear deal… At first I thought maybe I slept on it wrong but it's been hurting for a couple days now.  I was going to try home remedies first… Can you suggest anything over the counter? Ibuprofen didn't seem to help anything.  I kind of feel like it started after I wore ear buds so I'm wondering if maybe it's bacterial.  Thanks!

## 2020-07-16 NOTE — TELEPHONE ENCOUNTER
Preferably she will go and have someone look in her ears such as a retail clinic or urgent care  To rule out any inner ear infection  Or to avoid antibiotics if is just ear pressure        Otherwise she can schedule a telehealth video visit either with someone here  Or after hour video visit

## 2020-07-17 ENCOUNTER — OFFICE VISIT (OUTPATIENT)
Dept: FAMILY MEDICINE CLINIC | Facility: CLINIC | Age: 50
End: 2020-07-17

## 2020-07-17 VITALS
TEMPERATURE: 97.1 F | SYSTOLIC BLOOD PRESSURE: 100 MMHG | WEIGHT: 184.2 LBS | HEIGHT: 68 IN | BODY MASS INDEX: 27.92 KG/M2 | DIASTOLIC BLOOD PRESSURE: 72 MMHG | OXYGEN SATURATION: 98 % | HEART RATE: 74 BPM

## 2020-07-17 DIAGNOSIS — H60.391 ACUTE INFECTION OF RIGHT PINNA: Primary | ICD-10-CM

## 2020-07-17 PROCEDURE — 99213 OFFICE O/P EST LOW 20 MIN: CPT | Performed by: FAMILY MEDICINE

## 2020-07-17 RX ORDER — CEPHALEXIN 500 MG/1
500 CAPSULE ORAL 2 TIMES DAILY
Qty: 14 CAPSULE | Refills: 0 | Status: SHIPPED | OUTPATIENT
Start: 2020-07-17 | End: 2020-08-05

## 2020-07-17 RX ORDER — DIAPER,BRIEF,INFANT-TODD,DISP
EACH MISCELLANEOUS 2 TIMES DAILY
Qty: 1 EACH | Refills: 0 | Status: SHIPPED | OUTPATIENT
Start: 2020-07-17 | End: 2020-08-05

## 2020-07-17 NOTE — PROGRESS NOTES
Subjective   Jacqueline Briceno is a 49 y.o. female.     Chief Complaint   Patient presents with   • Earache     C/o rt ear pain x 1 week, has tried otc products but not helping.       History of Present Illness  Jacqueline Briceno is a 49-year-old female patient came here with complaint of right ear pain.  Patient denies any cold congestion or drainage from ER.  She is not able to sleep on on the right side.  She tried peroxide and over-the-counter medication did not help with the pain.  She denies any fever.  He can produce pain on pulling the right ear.    Past Medical History:   Diagnosis Date   • Anxiety    • Depression    • Injury of back    • MS (multiple sclerosis) (CMS/[facility])        Past Surgical History:   Procedure Laterality Date   • AUGMENTATION MAMMAPLASTY     •  SECTION         Family History   Problem Relation Age of Onset   • No Known Problems Father    • No Known Problems Mother        Social History     Socioeconomic History   • Marital status: Unknown     Spouse name: Not on file   • Number of children: Not on file   • Years of education: Not on file   • Highest education level: Not on file   Tobacco Use   • Smoking status: Former Smoker   • Smokeless tobacco: Never Used   Substance and Sexual Activity   • Alcohol use: Yes     Alcohol/week: 9.0 standard drinks     Types: 2 Glasses of wine, 4 Cans of beer, 3 Shots of liquor per week   • Drug use: No   • Sexual activity: Never       The following portions of the patient's history were reviewed and updated as appropriate: allergies, current medications, past family history, past medical history, past social history, past surgical history and problem list.    Review of Systems   Constitutional: Negative for activity change, appetite change, fatigue, fever, unexpected weight gain and unexpected weight loss.   HENT: Positive for ear pain. Negative for congestion, ear discharge, facial swelling, hearing loss, postnasal drip, rhinorrhea, sinus pressure, sore  "throat and voice change.    Eyes: Negative for blurred vision, discharge, itching and visual disturbance.   Respiratory: Negative for cough, chest tightness, shortness of breath and wheezing.    Cardiovascular: Negative for chest pain, palpitations and leg swelling.   Gastrointestinal: Negative for abdominal pain, constipation, diarrhea, nausea and vomiting.   Musculoskeletal: Negative for arthralgias.   Neurological: Negative for headache.   Psychiatric/Behavioral: Negative for agitation, suicidal ideas, depressed mood and stress.       Objective   Vitals:    07/17/20 1213   BP: 100/72   Pulse: 74   Temp: 97.1 °F (36.2 °C)   TempSrc: Temporal   SpO2: 98%   Weight: 83.6 kg (184 lb 3.2 oz)   Height: 172.7 cm (67.99\")     Body mass index is 28.02 kg/m².  Physical Exam   Constitutional: She is oriented to person, place, and time. She appears well-developed and well-nourished. No distress.   HENT:   Head: Normocephalic and atraumatic.   Right Ear: Tympanic membrane normal. There is tenderness. No drainage. There is mastoid tenderness. No decreased hearing is noted.   Left Ear: Tympanic membrane and external ear normal. No drainage. No mastoid tenderness. No decreased hearing is noted.   Nose: Nose normal.   Mouth/Throat: Oropharynx is clear and moist.   eryethnma and induration right inner auricle   Eyes: Pupils are equal, round, and reactive to light. EOM are normal. Right eye exhibits no discharge. Left eye exhibits no discharge.   Neck: Normal range of motion. Neck supple.   Cardiovascular: Normal rate, regular rhythm and normal heart sounds.   Pulmonary/Chest: Effort normal and breath sounds normal. She has no wheezes. She has no rales.   Abdominal: Soft. Bowel sounds are normal. She exhibits no mass. There is no tenderness.   Musculoskeletal: She exhibits no edema.   Lymphadenopathy:     She has no cervical adenopathy.   Neurological: She is alert and oriented to person, place, and time.         Assessment/Plan "   Problem List Items Addressed This Visit     None      Visit Diagnoses     Acute infection of right pinna    -  Primary    Relevant Medications    cephalexin (Keflex) 500 MG capsule    hydrocortisone 1 % cream        Jacqueline Briceno is a 49-year-old female came here for right ear pain for 1 week. Will start her on antibiotics and topical steroid cream.            Return if symptoms worsen or fail to improve, for Recheck.

## 2020-08-05 ENCOUNTER — OFFICE VISIT (OUTPATIENT)
Dept: OBSTETRICS AND GYNECOLOGY | Facility: CLINIC | Age: 50
End: 2020-08-05

## 2020-08-05 VITALS
HEIGHT: 68 IN | WEIGHT: 195 LBS | DIASTOLIC BLOOD PRESSURE: 62 MMHG | SYSTOLIC BLOOD PRESSURE: 122 MMHG | BODY MASS INDEX: 29.55 KG/M2

## 2020-08-05 DIAGNOSIS — Z13.9 SCREENING FOR CONDITION: ICD-10-CM

## 2020-08-05 DIAGNOSIS — Z11.3 SCREEN FOR STD (SEXUALLY TRANSMITTED DISEASE): ICD-10-CM

## 2020-08-05 DIAGNOSIS — Z30.09 COUNSELING FOR BIRTH CONTROL REGARDING INTRAUTERINE DEVICE (IUD): ICD-10-CM

## 2020-08-05 DIAGNOSIS — Z01.419 ROUTINE GYNECOLOGICAL EXAMINATION: ICD-10-CM

## 2020-08-05 DIAGNOSIS — Z01.419 PAP SMEAR, LOW-RISK: Primary | ICD-10-CM

## 2020-08-05 DIAGNOSIS — Z11.51 SPECIAL SCREENING EXAMINATION FOR HUMAN PAPILLOMAVIRUS (HPV): ICD-10-CM

## 2020-08-05 LAB
B-HCG UR QL: NEGATIVE
BILIRUB BLD-MCNC: NEGATIVE MG/DL
CLARITY, POC: CLEAR
COLOR UR: YELLOW
GLUCOSE UR STRIP-MCNC: NEGATIVE MG/DL
INTERNAL NEGATIVE CONTROL: NEGATIVE
INTERNAL POSITIVE CONTROL: POSITIVE
KETONES UR QL: NEGATIVE
LEUKOCYTE EST, POC: NEGATIVE
Lab: NORMAL
NITRITE UR-MCNC: NEGATIVE MG/ML
PH UR: 5 [PH] (ref 5–8)
PROT UR STRIP-MCNC: NEGATIVE MG/DL
RBC # UR STRIP: NEGATIVE /UL
SP GR UR: 1 (ref 1–1.03)
UROBILINOGEN UR QL: NORMAL

## 2020-08-05 PROCEDURE — 81025 URINE PREGNANCY TEST: CPT | Performed by: OBSTETRICS & GYNECOLOGY

## 2020-08-05 PROCEDURE — 99386 PREV VISIT NEW AGE 40-64: CPT | Performed by: OBSTETRICS & GYNECOLOGY

## 2020-08-05 NOTE — PROGRESS NOTES
GYN Annual Exam     CC- Here for annual exam.     Jacqueline Briceno is a 49 y.o. female new patient who presents for annual well woman exam. Periods are rare, lasting 1 days.  She has a Mirena that  1-2 years ago. She has MS.     OB History        2    Para   1    Term   1            AB   1    Living   1       SAB        TAB   1    Ectopic        Molar        Multiple        Live Births              Obstetric Comments   1 C/S  1 VIP             Menarche: 15  Current contraception: IUD Mirena  History of abnormal Pap smear: yes -  intermittent abnormals  History of abnormal mammogram: no  Family history of uterine, colon or ovarian cancer: no  Family history of breast cancer: no  H/o STDs: none  Last pap: ? 2017  Gardasil:missed  TIANNA: none    Health Maintenance   Topic Date Due   • TDAP/TD VACCINES (1 - Tdap) 10/28/1983   • HEPATITIS C SCREENING  2018   • COLONOSCOPY  2018   • ANNUAL PHYSICAL  2019   • INFLUENZA VACCINE  2020   • Annual Gynecologic Pelvic and Breast Exam  2021   • PAP SMEAR  2021       Past Medical History:   Diagnosis Date   • Abnormal Pap smear of cervix     intermittently abnl paps   • Anxiety    • Depression    • Injury of back    • MS (multiple sclerosis) (CMS/HCC)    • OAB (overactive bladder)        Past Surgical History:   Procedure Laterality Date   • AUGMENTATION MAMMAPLASTY     •  SECTION      x 1   • D&C WITH SUCTION      x 1   • MANDIBLE SURGERY     • WISDOM TOOTH EXTRACTION           Current Outpatient Medications:   •  Dimethyl Fumarate (TECFIDERA PO), Take  by mouth., Disp: , Rfl:   •  levonorgestrel (MIRENA) 20 MCG/24HR IUD, 1 each by Intrauterine route., Disp: , Rfl:     Allergies   Allergen Reactions   • Phenyltoloxamine-Acetaminophen Itching     Some type IV pain med causes itching       Social History     Tobacco Use   • Smoking status: Former Smoker   • Smokeless tobacco: Never Used   Substance Use Topics   • Alcohol  "use: Yes     Alcohol/week: 9.0 standard drinks     Types: 2 Glasses of wine, 4 Cans of beer, 3 Shots of liquor per week     Comment: pt says she \"used to have an alcohol problem\"   • Drug use: No       Family History   Problem Relation Age of Onset   • No Known Problems Father    • No Known Problems Mother    • Breast cancer Other    • Ovarian cancer Neg Hx    • Colon cancer Neg Hx    • Deep vein thrombosis Neg Hx    • Pulmonary embolism Neg Hx        Review of Systems   Constitutional: Positive for activity change. Negative for appetite change, fatigue, fever and unexpected weight change.   Eyes: Negative for photophobia and visual disturbance.   Respiratory: Negative for cough and shortness of breath.    Cardiovascular: Negative for chest pain and palpitations.   Gastrointestinal: Negative for abdominal distention, abdominal pain, constipation, diarrhea and nausea.   Endocrine: Negative for cold intolerance and heat intolerance.   Genitourinary: Positive for frequency and urgency. Negative for dyspareunia, dysuria, menstrual problem, pelvic pain and vaginal discharge.   Musculoskeletal: Negative for back pain.   Skin: Negative for color change and rash.   Neurological: Negative for headaches.   Hematological: Negative for adenopathy. Does not bruise/bleed easily.   Psychiatric/Behavioral: Negative for dysphoric mood. The patient is not nervous/anxious.        /62   Ht 172.7 cm (68\")   Wt 88.5 kg (195 lb)   Breastfeeding No   BMI 29.65 kg/m²     Physical Exam   Constitutional: She is oriented to person, place, and time. She appears well-developed and well-nourished.   HENT:   Head: Normocephalic and atraumatic.   Eyes: Conjunctivae are normal. No scleral icterus.   Neck: Neck supple. No thyromegaly present.   Cardiovascular: Normal rate and regular rhythm.   Pulmonary/Chest: Effort normal and breath sounds normal. Right breast exhibits no inverted nipple, no mass, no nipple discharge, no skin change and " no tenderness. Left breast exhibits no inverted nipple, no mass, no nipple discharge, no skin change and no tenderness.   Implants noted   Abdominal: Soft. Bowel sounds are normal. She exhibits no distension and no mass. There is no tenderness. There is no rebound and no guarding. No hernia.   Genitourinary: Pelvic exam was performed with patient supine. There is no rash, tenderness or lesion on the right labia. There is no rash, tenderness or lesion on the left labia. Uterus is not deviated, not enlarged, not fixed and not tender. Cervix exhibits no motion tenderness, no discharge and no friability. Right adnexum displays no mass, no tenderness and no fullness. Left adnexum displays no mass, no tenderness and no fullness. No erythema, tenderness or bleeding in the vagina. No foreign body in the vagina. No signs of injury around the vagina. No vaginal discharge found.   Genitourinary Comments: IUD string seen   Neurological: She is alert and oriented to person, place, and time.   Skin: Skin is warm and dry.   Psychiatric: She has a normal mood and affect. Her behavior is normal. Judgment and thought content normal.   Nursing note and vitals reviewed.         Assessment/Plan    1) GYN HM: pap/HPV/C/G/T  SBE demonstrated and encouraged.  2) STD screening: accepts Condoms encouraged.  3) Contraception: her Mirena has  several years ago. Needs a new Mirena, will order and plan an exchange.  4) Family Planning: family planning: childbearing completed, encourage folic acid daily  5) Diet and Exercise discussed  6) Smoking Status: No  7) C scope- refer C scope  8) MMG-  due, will schedule  9)I saw the patient with a face mask, gloves and eye protection  The patient herself was masked.  Social distancing was observed as appropriate.  10)Follow up for IUD exchange or 1 year annual.       Jacqueline was seen today for gynecologic exam.    Diagnoses and all orders for this visit:    Pap smear, low-risk  -     POC Pregnancy,  Urine  -     POC Urinalysis Dipstick  -     Cancel: Pap IG, HPV-hr  -     PapIG, CtNgTv, HPV, Rfx 16 / 18  -     Cancel: Hepatitis B Surface Antigen  -     Cancel: Hepatitis C Antibody  -     Cancel: HIV-1 / O / 2 Ag / Antibody 4th Generation  -     Cancel: HSV 1 & 2 - Specific Antibody, IgG  -     Cancel: RPR, Rfx Qn RPR / Confirm TP    Routine gynecological examination  -     POC Pregnancy, Urine  -     POC Urinalysis Dipstick  -     Cancel: Pap IG, HPV-hr  -     PapIG, CtNgTv, HPV, Rfx 16 / 18  -     Cancel: Hepatitis B Surface Antigen  -     Cancel: Hepatitis C Antibody  -     Cancel: HIV-1 / O / 2 Ag / Antibody 4th Generation  -     Cancel: HSV 1 & 2 - Specific Antibody, IgG  -     Cancel: RPR, Rfx Qn RPR / Confirm TP    Special screening examination for human papillomavirus (HPV)  -     POC Pregnancy, Urine  -     POC Urinalysis Dipstick  -     Cancel: Pap IG, HPV-hr  -     PapIG, CtNgTv, HPV, Rfx 16 / 18  -     Cancel: Hepatitis B Surface Antigen  -     Cancel: Hepatitis C Antibody  -     Cancel: HIV-1 / O / 2 Ag / Antibody 4th Generation  -     Cancel: HSV 1 & 2 - Specific Antibody, IgG  -     Cancel: RPR, Rfx Qn RPR / Confirm TP    Screening for condition  -     Mammo Screening Bilateral With CAD; Future  -     Ambulatory Referral For Screening Colonoscopy    Screen for STD (sexually transmitted disease)    Counseling for birth control regarding intrauterine device (IUD)          Bronwyn Kaur MD  08/05/2020    23:32

## 2020-08-07 LAB
C TRACH RRNA CVX QL NAA+PROBE: NEGATIVE
CYTOLOGIST CVX/VAG CYTO: NORMAL
CYTOLOGY CVX/VAG DOC CYTO: NORMAL
CYTOLOGY CVX/VAG DOC THIN PREP: NORMAL
DX ICD CODE: NORMAL
HIV 1 & 2 AB SER-IMP: NORMAL
HPV I/H RISK 1 DNA CVX QL PROBE+SIG AMP: NEGATIVE
N GONORRHOEA RRNA CVX QL NAA+PROBE: NEGATIVE
OTHER STN SPEC: NORMAL
STAT OF ADQ CVX/VAG CYTO-IMP: NORMAL
T VAGINALIS RRNA SPEC QL NAA+PROBE: NEGATIVE

## 2020-08-18 NOTE — PROGRESS NOTES
Covered under medical as buy and bill, device available. I called the patient and lmom to call and schedule an appointment for replacement.

## 2020-08-19 ENCOUNTER — DOCUMENTATION (OUTPATIENT)
Dept: PHYSICAL THERAPY | Facility: CLINIC | Age: 50
End: 2020-08-19

## 2020-08-19 DIAGNOSIS — M25.562 CHRONIC PAIN OF LEFT KNEE: Primary | ICD-10-CM

## 2020-08-19 DIAGNOSIS — M22.2X2 PATELLOFEMORAL PAIN SYNDROME OF LEFT KNEE: ICD-10-CM

## 2020-08-19 DIAGNOSIS — R26.2 DIFFICULTY WALKING: ICD-10-CM

## 2020-08-19 DIAGNOSIS — G89.29 CHRONIC PAIN OF LEFT KNEE: Primary | ICD-10-CM

## 2020-08-19 NOTE — PROGRESS NOTES
Closure of Physical Therapy Encounter    Pt called to cancel appts stating she was released per MD Briana Coe, PT  Physical Therapist

## 2020-09-11 DIAGNOSIS — R79.89 ELEVATED LIVER FUNCTION TESTS: ICD-10-CM

## 2020-09-11 DIAGNOSIS — D69.6 THROMBOCYTOPENIA (HCC): Primary | ICD-10-CM

## 2020-09-17 ENCOUNTER — RESULTS ENCOUNTER (OUTPATIENT)
Dept: FAMILY MEDICINE CLINIC | Facility: CLINIC | Age: 50
End: 2020-09-17

## 2020-09-17 DIAGNOSIS — R79.89 ELEVATED LIVER FUNCTION TESTS: ICD-10-CM

## 2020-09-17 DIAGNOSIS — D69.6 THROMBOCYTOPENIA (HCC): ICD-10-CM

## 2020-09-30 ENCOUNTER — APPOINTMENT (OUTPATIENT)
Dept: MAMMOGRAPHY | Facility: HOSPITAL | Age: 50
End: 2020-09-30

## 2020-12-07 DIAGNOSIS — D69.6 THROMBOCYTOPENIA (HCC): Primary | ICD-10-CM

## 2020-12-10 RX ORDER — VALACYCLOVIR HYDROCHLORIDE 500 MG/1
500 TABLET, FILM COATED ORAL 2 TIMES DAILY
Qty: 14 TABLET | Refills: 6 | Status: SHIPPED | OUTPATIENT
Start: 2020-12-10 | End: 2020-12-17

## 2020-12-23 ENCOUNTER — APPOINTMENT (OUTPATIENT)
Dept: OTHER | Facility: HOSPITAL | Age: 50
End: 2020-12-23

## 2021-03-31 ENCOUNTER — BULK ORDERING (OUTPATIENT)
Dept: CASE MANAGEMENT | Facility: OTHER | Age: 51
End: 2021-03-31

## 2021-03-31 DIAGNOSIS — Z23 IMMUNIZATION DUE: ICD-10-CM

## 2021-11-03 ENCOUNTER — OFFICE VISIT (OUTPATIENT)
Dept: OBSTETRICS AND GYNECOLOGY | Facility: CLINIC | Age: 51
End: 2021-11-03

## 2021-11-03 VITALS
HEIGHT: 68 IN | BODY MASS INDEX: 32.43 KG/M2 | SYSTOLIC BLOOD PRESSURE: 126 MMHG | WEIGHT: 214 LBS | DIASTOLIC BLOOD PRESSURE: 72 MMHG

## 2021-11-03 DIAGNOSIS — N91.2 AMENORRHEA: ICD-10-CM

## 2021-11-03 DIAGNOSIS — Z11.51 SPECIAL SCREENING EXAMINATION FOR HUMAN PAPILLOMAVIRUS (HPV): ICD-10-CM

## 2021-11-03 DIAGNOSIS — R14.0 BLOATING: ICD-10-CM

## 2021-11-03 DIAGNOSIS — Z01.419 PAP SMEAR, LOW-RISK: ICD-10-CM

## 2021-11-03 DIAGNOSIS — Z12.9 SCREENING FOR CANCER: ICD-10-CM

## 2021-11-03 DIAGNOSIS — Z30.430 ENCOUNTER FOR IUD INSERTION: ICD-10-CM

## 2021-11-03 DIAGNOSIS — R63.5 WEIGHT GAIN: Primary | ICD-10-CM

## 2021-11-03 DIAGNOSIS — Z01.419 ROUTINE GYNECOLOGICAL EXAMINATION: ICD-10-CM

## 2021-11-03 PROCEDURE — 99396 PREV VISIT EST AGE 40-64: CPT | Performed by: OBSTETRICS & GYNECOLOGY

## 2021-11-03 PROCEDURE — 99214 OFFICE O/P EST MOD 30 MIN: CPT | Performed by: OBSTETRICS & GYNECOLOGY

## 2021-11-03 RX ORDER — MODAFINIL 100 MG/1
100 TABLET ORAL
COMMUNITY
Start: 2021-09-28

## 2021-11-03 RX ORDER — ESCITALOPRAM OXALATE 20 MG/1
TABLET ORAL
COMMUNITY
Start: 2021-09-26 | End: 2021-11-03

## 2021-11-03 RX ORDER — OXYBUTYNIN CHLORIDE 10 MG/1
10 TABLET, EXTENDED RELEASE ORAL DAILY
COMMUNITY
End: 2021-12-15

## 2021-11-03 NOTE — PROGRESS NOTES
GYN Annual Exam     CC- Here for annual exam.     Jacqueline Briceno is a 51 y.o. female est pt who presents for annual well woman exam and discussion of multiple issues. . Periods are rare, lasting 1 days.  She has a Mirena that  1-2 years ago. She has MS. She has had weight gain, hot flashes and bloating. She has not had bloating. She US today is 7 cm AV uterus with EL 0.7 cm and a fibrod measures 2 x 1.8 cm. Her IUD is seen in the endometrium and there is some fluid in the cul de sac. There is no comparable data.  We discussed removing her IUD today and replacing a new IUD versus testing her for menopause and placing an IUD only if she is not menopausal.     OB History        2    Para   1    Term   1            AB   1    Living   1       SAB        IAB   1    Ectopic        Molar        Multiple        Live Births              Obstetric Comments   1 C/S  1 VIP             Menarche: 15  Current contraception: IUD Mirena  History of abnormal Pap smear: yes -  intermittent abnormals  History of abnormal mammogram: no  Family history of uterine, colon or ovarian cancer: no  Family history of breast cancer: no  H/o STDs: none  Last pap: 2020- normal pap/HPV  Gardasil:missed  TIANNA: none    Health Maintenance   Topic Date Due   • COLORECTAL CANCER SCREENING  Never done   • TDAP/TD VACCINES (1 - Tdap) Never done   • HEPATITIS C SCREENING  Never done   • ANNUAL PHYSICAL  2019   • INFLUENZA VACCINE  Never done   • Annual Gynecologic Pelvic and Breast Exam  2022   • PAP SMEAR  2023   • COVID-19 Vaccine  Completed   • Pneumococcal Vaccine 0-64  Aged Out       Past Medical History:   Diagnosis Date   • Abnormal Pap smear of cervix     intermittently abnl paps   • Anxiety    • Depression    • Injury of back    • MS (multiple sclerosis) (HCC)    • OAB (overactive bladder)        Past Surgical History:   Procedure Laterality Date   • AUGMENTATION MAMMAPLASTY     •  SECTION      x 1   • D  "& C WITH SUCTION      x 1   • MANDIBLE SURGERY     • WISDOM TOOTH EXTRACTION           Current Outpatient Medications:   •  modafinil (PROVIGIL) 100 MG tablet, Take 100 mg by mouth., Disp: , Rfl:   •  oxybutynin XL (DITROPAN-XL) 10 MG 24 hr tablet, Take 10 mg by mouth Daily., Disp: , Rfl:   •  levonorgestrel (MIRENA) 20 MCG/24HR IUD, 1 each by Intrauterine route., Disp: , Rfl:     Allergies   Allergen Reactions   • Phenyltoloxamine-Acetaminophen Itching     Some type IV pain med causes itching       Social History     Tobacco Use   • Smoking status: Former Smoker   • Smokeless tobacco: Never Used   Substance Use Topics   • Alcohol use: Yes     Alcohol/week: 9.0 standard drinks     Types: 2 Glasses of wine, 4 Cans of beer, 3 Shots of liquor per week     Comment: pt says she \"used to have an alcohol problem\"   • Drug use: No       Family History   Problem Relation Age of Onset   • No Known Problems Father    • No Known Problems Mother    • Breast cancer Other    • Ovarian cancer Neg Hx    • Colon cancer Neg Hx    • Deep vein thrombosis Neg Hx    • Pulmonary embolism Neg Hx        Review of Systems   Constitutional: Positive for activity change and unexpected weight change. Negative for appetite change, fatigue and fever.   Eyes: Negative for photophobia and visual disturbance.   Respiratory: Negative for cough and shortness of breath.    Cardiovascular: Negative for chest pain and palpitations.   Gastrointestinal: Positive for abdominal distention. Negative for abdominal pain, constipation, diarrhea and nausea.   Endocrine: Positive for heat intolerance. Negative for cold intolerance.   Genitourinary: Negative for dyspareunia, dysuria, frequency, menstrual problem, pelvic pain, urgency, vaginal bleeding, vaginal discharge and vaginal pain.   Musculoskeletal: Negative for back pain.   Skin: Negative for color change and rash.   Neurological: Negative for headaches.   Hematological: Negative for adenopathy. Does not " "bruise/bleed easily.   Psychiatric/Behavioral: Negative for dysphoric mood. The patient is not nervous/anxious.        /72   Ht 172.7 cm (68\")   Wt 97.1 kg (214 lb)   LMP  (LMP Unknown)   Breastfeeding No   BMI 32.54 kg/m²     Physical Exam   Constitutional: She is oriented to person, place, and time. She appears well-developed.   HENT:   Head: Normocephalic and atraumatic.   Eyes: Conjunctivae are normal. No scleral icterus.   Neck: No thyromegaly present.   Cardiovascular: Normal rate and regular rhythm.   Pulmonary/Chest: Effort normal and breath sounds normal. Right breast exhibits no inverted nipple, no mass, no nipple discharge, no skin change and no tenderness. Left breast exhibits no inverted nipple, no mass, no nipple discharge, no skin change and no tenderness.   Implants noted   Abdominal: Soft. Normal appearance and bowel sounds are normal. She exhibits no distension and no mass. There is no abdominal tenderness. There is no rebound and no guarding. No hernia.   Genitourinary:    Pelvic exam was performed with patient supine.   There is no rash, tenderness or lesion on the right labia. There is no rash, tenderness or lesion on the left labia. Uterus is not deviated, not enlarged, not fixed and not tender. Cervix exhibits no motion tenderness, no discharge and no friability. Right adnexum displays no mass, no tenderness and no fullness. Left adnexum displays no mass, no tenderness and no fullness.    No vaginal discharge, erythema, tenderness or bleeding.   No erythema, tenderness or bleeding in the vagina.    No foreign body in the vagina.      No signs of injury in the vagina.      Genitourinary Comments: IUD string seen     Neurological: She is alert and oriented to person, place, and time.   Skin: Skin is warm and dry.   Psychiatric: Her behavior is normal. Mood, judgment and thought content normal.   Nursing note and vitals reviewed.         Assessment/Plan    1) GYN HM: normal pap/HPV " 2020 SBE demonstrated and encouraged.  2) STD screening: declines. Condoms encouraged.  3) Contraception: her Mirena has  several years ago.Will check FSH and E2. If she is not menopausal, she will need a new Mirena, will order and plan an exchange. If her she is menopausal, we will remove the IUD.  4) Family Planning: family planning: childbearing completed, encourage folic acid daily  5) Diet and Exercise discussed  6) Smoking Status: No  7) C scope- refer C scope EP  8) MMG- UTD , schedule MMG now  9)Bloating- normal US, enc pt to f/u with GI  10)Weight gain- check thyroid panel and TPO  11) Parts of this document have been copied or forwarded from her previous visits and have been reviewed, updated and edited as indicated.   12)I saw the patient with a face mask, gloves and eye protection  The patient herself was masked.  Social distancing was observed as appropriate.  13)Follow up for IUD exchange if needed and 1 year annual.       Diagnoses and all orders for this visit:    1. Weight gain (Primary)  -     T3  -     T4, Free  -     TSH  -     Thyroid Peroxidase Antibody    2. Routine gynecological examination  -     POC Urinalysis Dipstick  -     POC Pregnancy, Urine  -     Cancel: IgP, Aptima HPV    3. Pap smear, low-risk  -     POC Urinalysis Dipstick  -     POC Pregnancy, Urine  -     Cancel: IgP, Aptima HPV    4. Special screening examination for human papillomavirus (HPV)  -     POC Urinalysis Dipstick  -     POC Pregnancy, Urine  -     Cancel: IgP, Aptima HPV    5. Encounter for IUD insertion  -     POC Urinalysis Dipstick  -     POC Pregnancy, Urine  -     Cancel: IgP, Aptima HPV    6. Amenorrhea  -     Follicle Stimulating Hormone  -     Estradiol    7. Screening for cancer  -     Mammo Screening Bilateral With CAD; Future  -     Ambulatory Referral For Screening Colonoscopy    8. Bloating          Bronwyn Kaur MD  11/3/2021  12:26 EST

## 2021-11-04 LAB
ESTRADIOL SERPL-MCNC: <5 PG/ML
FSH SERPL-ACNC: 110 MIU/ML
T3 SERPL-MCNC: 76 NG/DL (ref 71–180)
T4 FREE SERPL-MCNC: 1.37 NG/DL (ref 0.82–1.77)
THYROPEROXIDASE AB SERPL-ACNC: 10 IU/ML (ref 0–34)
TSH SERPL DL<=0.005 MIU/L-ACNC: 1.63 UIU/ML (ref 0.45–4.5)

## 2021-11-09 NOTE — PROGRESS NOTES
PIP= normal thyroid testing. Labs indicate menopause. Repeat FHS and E2 in 2 weeks and if still elevated we can remove her IUD without a replacement. If she still wants a new IUD placed, that is fine as well.

## 2021-11-12 ENCOUNTER — TRANSCRIBE ORDERS (OUTPATIENT)
Dept: ADMINISTRATIVE | Facility: HOSPITAL | Age: 51
End: 2021-11-12

## 2021-11-12 DIAGNOSIS — Z12.31 VISIT FOR SCREENING MAMMOGRAM: Primary | ICD-10-CM

## 2021-12-09 ENCOUNTER — OFFICE VISIT (OUTPATIENT)
Dept: FAMILY MEDICINE CLINIC | Facility: CLINIC | Age: 51
End: 2021-12-09

## 2021-12-09 VITALS
HEIGHT: 68 IN | WEIGHT: 211.8 LBS | OXYGEN SATURATION: 99 % | DIASTOLIC BLOOD PRESSURE: 80 MMHG | SYSTOLIC BLOOD PRESSURE: 111 MMHG | BODY MASS INDEX: 32.1 KG/M2 | HEART RATE: 65 BPM | TEMPERATURE: 97.5 F

## 2021-12-09 DIAGNOSIS — R00.0 RACING HEART BEAT: ICD-10-CM

## 2021-12-09 DIAGNOSIS — R00.2 PALPITATIONS: Primary | ICD-10-CM

## 2021-12-09 PROCEDURE — 99214 OFFICE O/P EST MOD 30 MIN: CPT | Performed by: NURSE PRACTITIONER

## 2021-12-09 NOTE — PATIENT INSTRUCTIONS
Discharge instructions  Labs today  Plenty of fluids  Keep caffeine to minimum  Keep track with to the minimum for a while    Outpatient Holter monitor call me for results  Return to office for other issues, otherwise recheck 2 to 3 months for complete physical and fasting labs prior    Should you have chest pain shortness of breath syncope weakness emergency room  Try Valsalva maneuver as we discussed for tachycardia if this returns and if not resolved after 20 minutes go to the emergency room call 911 if you have recurrent symptoms as well notify me please for referral to cardiology for further evaluation or if you decide you would like to see cardiologist    Dietary changes, encourage slow gradual changes,  People places and things  Watch access such as not bringing snack food at home etc.,  Consider intermittent fasting  Lots of vegetables mostly vegetables chicken fish release types of foods, greatly reduce breads pastas and sweets avoid all fast food avoid all sugary beverages or excessive milk etc. lots of water fluids tea etc. 64 ounces a day  Some type of activity daily break a sweat doing something fun whenever it is    Bright lights positive music,  Consider working with a girlfriend to have some goals over the next several months,  Keep calories less than 1400 daily be consistent    As long as you are doing well follow-up  In 3 to 4 months for fasting lab and recheck    Consider weight watchers

## 2021-12-09 NOTE — PROGRESS NOTES
"Chief Complaint  Dizziness    Subjective          Jacqueline Briceno presents to [facility]  Pleasant patient complains of a couple nights ago had racing heart, lasted about 15 or 20 seconds around 200  She felt a little short of breath and then it went away  Presently no chest pain no shortness of breath no racing heart she has no chronic recurrent symptoms no syncope no near syncope no headache confusion weakness slurred speech no paresthesias or other difficulties no gait difficulties  \No problems with recurrent palpitations  Generally healthy doing well migraines been controlled, MS stable    Dizziness        Objective   Vital Signs:   /80 (BP Location: Left arm, Patient Position: Sitting)   Pulse 65   Temp 97.5 °F (36.4 °C) (Temporal)   Ht 172.7 cm (67.99\")   Wt 96.1 kg (211 lb 12.8 oz)   SpO2 99%   BMI 32.21 kg/m²     Physical Exam  Vitals reviewed.   Constitutional:       Appearance: Normal appearance.      Comments: Patient is pleasant appears well   Eyes:      Pupils: Pupils are equal, round, and reactive to light.   Cardiovascular:      Rate and Rhythm: Normal rate and regular rhythm.      Pulses: Normal pulses.      Heart sounds: Normal heart sounds.   Pulmonary:      Effort: Pulmonary effort is normal.   Skin:     General: Skin is warm and dry.   Neurological:      General: No focal deficit present.      Mental Status: She is alert and oriented to person, place, and time.   Psychiatric:         Mood and Affect: Mood normal.         Behavior: Behavior normal.        Result Review :                 Assessment and Plan    Diagnoses and all orders for this visit:    1. Palpitations (Primary)  -     CBC & Differential  -     Comprehensive Metabolic Panel  -     Magnesium  -     Holter monitor - 24 hour    2. Racing heart beat  -     CBC & Differential  -     Comprehensive Metabolic Panel  -     Magnesium  -     Holter monitor - 24 hour      I spent 30  minutes caring for " Jacqueline on this date of service. This time includes time spent by me in the following activities:preparing for the visit, performing a medically appropriate examination and/or evaluation , counseling and educating the patient/family/caregiver, ordering medications, tests, or procedures, documenting information in the medical record, independently interpreting results and communicating that information with the patient/family/caregiver and care coordination  Follow Up   Return in about 4 months (around 4/9/2022), or Labs today, and prior to next visit please.  Patient was given instructions and counseling regarding her condition or for health maintenance advice. Please see specific information pulled into the AVS if appropriate.     Discharge instructions  Labs today  Plenty of fluids  Keep caffeine to minimum  Keep track with to the minimum for a while    Outpatient Holter monitor call me for results  Return to office for other issues, otherwise recheck 2 to 3 months for complete physical and fasting labs prior    Should you have chest pain shortness of breath syncope weakness emergency room  Try Valsalva maneuver as we discussed for tachycardia if this returns and if not resolved after 20 minutes go to the emergency room call 911 if you have recurrent symptoms as well notify me please for referral to cardiology for further evaluation or if you decide you would like to see cardiologist    Dietary changes, encourage slow gradual changes,  People places and things  Watch access such as not bringing snack food at home etc.,  Consider intermittent fasting  Lots of vegetables mostly vegetables chicken fish release types of foods, greatly reduce breads pastas and sweets avoid all fast food avoid all sugary beverages or excessive milk etc. lots of water fluids tea etc. 64 ounces a day  Some type of activity daily break a sweat doing something fun whenever it is    Bright lights positive music,  Consider working with a  girlfriend to have some goals over the next several months,  Keep calories less than 1400 daily be consistent    As long as you are doing well follow-up  In 3 to 4 months for fasting lab and recheck    Consider weight watchers

## 2021-12-13 NOTE — PROGRESS NOTES
Procedure   Procedures     Adult ECG Report     Name: Jacqueline Briceno   Age: 51 y.o.   Gender: female       Rate: 57   Rhythm: sinus bradycardia   QRS Axis: nml   VT Interval: 124   QRS Duration: 91   QTc: 428   Voltages: .   Conduction Disturbances: none   Other Abnormalities: none     Narrative Interpretation: Sinus bradycardia normal EKG indications palpitations no prior EKG available for comparison

## 2021-12-15 ENCOUNTER — OFFICE VISIT (OUTPATIENT)
Dept: OBSTETRICS AND GYNECOLOGY | Facility: CLINIC | Age: 51
End: 2021-12-15

## 2021-12-15 VITALS
HEIGHT: 68 IN | SYSTOLIC BLOOD PRESSURE: 118 MMHG | WEIGHT: 210.2 LBS | BODY MASS INDEX: 31.86 KG/M2 | DIASTOLIC BLOOD PRESSURE: 86 MMHG

## 2021-12-15 DIAGNOSIS — Z13.9 SCREENING FOR CONDITION: Primary | ICD-10-CM

## 2021-12-15 DIAGNOSIS — Z12.11 SCREENING FOR COLON CANCER: ICD-10-CM

## 2021-12-15 DIAGNOSIS — Z30.09 COUNSELING FOR BIRTH CONTROL REGARDING INTRAUTERINE DEVICE (IUD): ICD-10-CM

## 2021-12-15 DIAGNOSIS — N95.1 PERIMENOPAUSAL: ICD-10-CM

## 2021-12-15 DIAGNOSIS — N32.81 OAB (OVERACTIVE BLADDER): ICD-10-CM

## 2021-12-15 DIAGNOSIS — Z12.31 ENCOUNTER FOR SCREENING MAMMOGRAM FOR MALIGNANT NEOPLASM OF BREAST: ICD-10-CM

## 2021-12-15 LAB
BILIRUB BLD-MCNC: NEGATIVE MG/DL
CLARITY, POC: CLEAR
COLOR UR: YELLOW
GLUCOSE UR STRIP-MCNC: NEGATIVE MG/DL
KETONES UR QL: NEGATIVE
LEUKOCYTE EST, POC: NEGATIVE
NITRITE UR-MCNC: NEGATIVE MG/ML
PH UR: 5 [PH] (ref 5–8)
PROT UR STRIP-MCNC: NEGATIVE MG/DL
RBC # UR STRIP: NEGATIVE /UL
SP GR UR: 1 (ref 1–1.03)
UROBILINOGEN UR QL: NORMAL

## 2021-12-15 PROCEDURE — 81002 URINALYSIS NONAUTO W/O SCOPE: CPT | Performed by: OBSTETRICS & GYNECOLOGY

## 2021-12-15 PROCEDURE — 99214 OFFICE O/P EST MOD 30 MIN: CPT | Performed by: OBSTETRICS & GYNECOLOGY

## 2021-12-26 NOTE — PROGRESS NOTES
"      Jacqueline Briceno is a 51 y.o. patient who presents for follow up of   Chief Complaint   Patient presents with   • Procedure     IUD REMOVAL      52 yo est pt here for possible Mirena IUD removal. Her device  2-3 years ago. She had a menopausal range FSH and E2 in 2021. She declines to have repeat labs done for confirmation due to cost. She is not SA at present and plans on condoms if she is SA. We discussed removal of IUD vs retention and she is not interested in a new device or labs at this time. We discussed new IUD data that talks about use for 7 years. She is wanting to wait and have her IUD removed at her annual next year. She declines C scope and wants a Cologuard instead. She is having worsening OAB and requests meds.         The following portions of the patient's history were reviewed and updated as appropriate: allergies, current medications and problem list.    Review of Systems   Constitutional: Positive for activity change and fatigue.   Genitourinary: Positive for urgency (OAB).   Musculoskeletal: Positive for gait problem.   Neurological: Positive for weakness.   All other systems reviewed and are negative.      /86   Ht 172.7 cm (68\")   Wt 95.3 kg (210 lb 3.2 oz)   BMI 31.96 kg/m²     Physical Exam  Vitals and nursing note reviewed.   Constitutional:       Appearance: Normal appearance. She is well-developed. She is obese.   HENT:      Head: Normocephalic and atraumatic.   Neck:      Thyroid: No thyromegaly.   Abdominal:      General: There is no distension.      Palpations: Abdomen is soft. There is no mass.      Tenderness: There is no abdominal tenderness. There is no guarding or rebound.      Hernia: No hernia is present.   Skin:     General: Skin is warm and dry.   Neurological:      Mental Status: She is alert and oriented to person, place, and time.   Psychiatric:         Mood and Affect: Mood normal.         Behavior: Behavior normal.         Thought Content: Thought " content normal.         Judgment: Judgment normal.         A/P:  1. IUD- device likely  or close to . Pt has high deductible plan and declines labs or IUD exchange. Enc her to have device removed in 2021 at annual. Use condoms until menopause confirmed.  2., Schedule MMG  3. Cscope- declines, prefers Cologuard. Pt understands that these tests are not equivalent in the detection of colon cancer  4. OAB- Rx Myrbetriq 25 mg. R/B/A d/w pt. Avoid bladder irritants. RTO 3 months symptoms  5. RTO 2022 annual or prn    Assessment/Plan   Diagnoses and all orders for this visit:    1. Screening for condition (Primary)  -     POC Urinalysis Dipstick    2. Encounter for screening mammogram for malignant neoplasm of breast  -     Mammo Screening Bilateral With CAD; Future    3. Screening for colon cancer  -     Cologuard - Stool, Per Rectum; Future    4. OAB (overactive bladder)    5. Counseling for birth control regarding intrauterine device (IUD)    6. Perimenopausal    Other orders  -     Mirabegron ER (Myrbetriq) 25 MG tablet sustained-release 24 hour 24 hr tablet; Take 1 tablet by mouth Daily.  Dispense: 30 tablet; Refill: 11                 Return in about 1 year (around 12/15/2022) for Annual physical.      Bronwyn Kaur MD    12/15/2021  14:12 EST

## 2022-04-07 RX ORDER — VALACYCLOVIR HYDROCHLORIDE 1 G/1
1000 TABLET, FILM COATED ORAL 2 TIMES DAILY
Qty: 20 TABLET | Refills: 0 | Status: SHIPPED | OUTPATIENT
Start: 2022-04-07 | End: 2022-04-17

## 2022-05-31 RX ORDER — ESCITALOPRAM OXALATE 10 MG/1
10 TABLET ORAL DAILY
Qty: 30 TABLET | Refills: 2 | Status: SHIPPED | OUTPATIENT
Start: 2022-05-31 | End: 2022-05-31 | Stop reason: ALTCHOICE

## 2022-05-31 RX ORDER — FLUOXETINE HYDROCHLORIDE 20 MG/1
20 CAPSULE ORAL DAILY
Qty: 30 CAPSULE | Refills: 2 | Status: SHIPPED | OUTPATIENT
Start: 2022-05-31 | End: 2022-07-11 | Stop reason: DRUGHIGH

## 2022-07-11 RX ORDER — FLUOXETINE HYDROCHLORIDE 40 MG/1
40 CAPSULE ORAL DAILY
Qty: 30 CAPSULE | Refills: 3 | Status: SHIPPED | OUTPATIENT
Start: 2022-07-11 | End: 2022-12-19

## 2022-08-01 RX ORDER — TOLTERODINE 4 MG/1
4 CAPSULE, EXTENDED RELEASE ORAL DAILY
Qty: 30 CAPSULE | Refills: 2 | Status: SHIPPED | OUTPATIENT
Start: 2022-08-01 | End: 2022-08-24

## 2022-08-04 ENCOUNTER — HOSPITAL ENCOUNTER (OUTPATIENT)
Dept: MAMMOGRAPHY | Facility: HOSPITAL | Age: 52
Discharge: HOME | End: 2022-08-04
Admitting: OBSTETRICS & GYNECOLOGY

## 2022-08-04 DIAGNOSIS — Z12.31 VISIT FOR SCREENING MAMMOGRAM: ICD-10-CM

## 2022-08-04 PROCEDURE — 77067 SCR MAMMO BI INCL CAD: CPT

## 2022-08-04 PROCEDURE — 77063 BREAST TOMOSYNTHESIS BI: CPT

## 2022-08-24 RX ORDER — OXYBUTYNIN CHLORIDE 10 MG/1
10 TABLET, EXTENDED RELEASE ORAL DAILY
Qty: 30 TABLET | Refills: 2 | Status: SHIPPED | OUTPATIENT
Start: 2022-08-24 | End: 2023-01-13 | Stop reason: SDUPTHER

## 2022-12-19 RX ORDER — FLUOXETINE HYDROCHLORIDE 40 MG/1
CAPSULE ORAL
Qty: 30 CAPSULE | Refills: 3 | Status: SHIPPED | OUTPATIENT
Start: 2022-12-19 | End: 2023-01-01 | Stop reason: SDUPTHER

## 2023-01-03 RX ORDER — FLUOXETINE HYDROCHLORIDE 40 MG/1
40 CAPSULE ORAL DAILY
Qty: 30 CAPSULE | Refills: 1 | Status: SHIPPED | OUTPATIENT
Start: 2023-01-03 | End: 2023-01-13 | Stop reason: SDUPTHER

## 2023-01-13 ENCOUNTER — OFFICE VISIT (OUTPATIENT)
Dept: FAMILY MEDICINE CLINIC | Facility: CLINIC | Age: 53
End: 2023-01-13
Payer: COMMERCIAL

## 2023-01-13 VITALS
SYSTOLIC BLOOD PRESSURE: 128 MMHG | RESPIRATION RATE: 18 BRPM | TEMPERATURE: 97.1 F | BODY MASS INDEX: 31.52 KG/M2 | OXYGEN SATURATION: 98 % | WEIGHT: 208 LBS | DIASTOLIC BLOOD PRESSURE: 74 MMHG | HEART RATE: 80 BPM | HEIGHT: 68 IN

## 2023-01-13 DIAGNOSIS — J01.00 ACUTE NON-RECURRENT MAXILLARY SINUSITIS: Primary | ICD-10-CM

## 2023-01-13 LAB
EXPIRATION DATE: NORMAL
FLUAV AG UPPER RESP QL IA.RAPID: NOT DETECTED
FLUBV AG UPPER RESP QL IA.RAPID: NOT DETECTED
INTERNAL CONTROL: NORMAL
Lab: NORMAL
SARS-COV-2 AG UPPER RESP QL IA.RAPID: NOT DETECTED

## 2023-01-13 PROCEDURE — 87428 SARSCOV & INF VIR A&B AG IA: CPT | Performed by: NURSE PRACTITIONER

## 2023-01-13 PROCEDURE — 99213 OFFICE O/P EST LOW 20 MIN: CPT | Performed by: NURSE PRACTITIONER

## 2023-01-13 RX ORDER — DEXTROAMPHETAMINE SACCHARATE, AMPHETAMINE ASPARTATE, DEXTROAMPHETAMINE SULFATE AND AMPHETAMINE SULFATE 1.25; 1.25; 1.25; 1.25 MG/1; MG/1; MG/1; MG/1
TABLET ORAL
COMMUNITY
Start: 2022-11-22

## 2023-01-13 RX ORDER — AMOXICILLIN AND CLAVULANATE POTASSIUM 875; 125 MG/1; MG/1
1 TABLET, FILM COATED ORAL EVERY 12 HOURS SCHEDULED
Qty: 14 TABLET | Refills: 0 | Status: SHIPPED | OUTPATIENT
Start: 2023-01-13

## 2023-01-13 RX ORDER — OXYBUTYNIN CHLORIDE 10 MG/1
10 TABLET, EXTENDED RELEASE ORAL DAILY
Qty: 90 TABLET | Refills: 1 | Status: SHIPPED | OUTPATIENT
Start: 2023-01-13 | End: 2024-01-13

## 2023-01-13 RX ORDER — FLUOXETINE HYDROCHLORIDE 40 MG/1
40 CAPSULE ORAL DAILY
Qty: 90 CAPSULE | Refills: 1 | Status: SHIPPED | OUTPATIENT
Start: 2023-01-13

## 2023-01-13 NOTE — PROGRESS NOTES
Chief Complaint  URI (X 5 days/Had Flu nov, Covid NOV) and Med Refill (Prozac)    Subjective        Jacqueline Briceno presents to [facility]  History of Present Illness      Jacqueline Briceno presents to the clinic today for evaluation of acute respiratory symptoms which have occurred for 5 days, as well as for medication refills.     Ms. Briceno has experienced COVID-19 and influenza in 11/2022. The patient is currently experiencing significant congestion and sinus pressure. She notes that she was feeling the worst on 01/12/2023. She has had a residual productive cough throughout 12/2022, which never truly resolved. She did experience a mild fever on 01/11/2023, which she believes broke on 01/12/2023. She has been exposed to a sick person on 01/08/2023, but she did not realize she would be feeling ill so quickly. She does experience sinus pressure in her cheek bones or forehead. She states she does not experience difficulties with her sinuses often so she has been having a difficult time. When coughing productively or blowing her nose, the patient has been excreting yellow mucus, which is sometimes accompanied by a bit of blood. She denies experiencing a sore throat or much drainage. She is concerned for lymphadenopathy on the left side of her neck. 'She states the back of her head hurts and feels pressure. She denies wheezing but she has experienced some shortness of breath. She believes that this may be because she is out of shape. She does not have any allergies to antibiotics. The patient notes that she has multiple sclerosis and she experiences tingling. She is not currently taking any medication for this due to insurance coverage. she goes to a neurological group in [place]. She had been diagnosed with multiple sclerosis in 2007, but she reports that she has been on the mild side of it. She had switched in recent years when having a flare up. She is currently taking Prozac 40 mg, which she  "states is working well for her. She is currently out of the medication and would like to obtain a refill. She has also been taking oxybutynin, as she has difficulties with her bladder. She notes that her MS doctor had prescribed Myrbetriq, but it was $400. She was able to use a coupon for the initial 30 days. She notes that she uses maximum flow pads.     Objective   Vital Signs:  /74 (BP Location: Right arm, Patient Position: Sitting, Cuff Size: Adult)   Pulse 80   Temp 97.1 °F (36.2 °C) (Temporal)   Resp 18   Ht 172.7 cm (67.99\")   Wt 94.3 kg (208 lb)   SpO2 98%   BMI 31.63 kg/m²   Estimated body mass index is 31.63 kg/m² as calculated from the following:    Height as of this encounter: 172.7 cm (67.99\").    Weight as of this encounter: 94.3 kg (208 lb).          Physical Exam  Vitals reviewed.   Constitutional:       Appearance: She is well-developed.   HENT:      Head: Normocephalic and atraumatic.      Right Ear: Tympanic membrane and ear canal normal. Tympanic membrane is not erythematous or bulging.      Left Ear: Tympanic membrane and ear canal normal. Tympanic membrane is not erythematous or bulging.      Nose: No mucosal edema.      Right Sinus: Maxillary sinus tenderness present. No frontal sinus tenderness.      Left Sinus: Maxillary sinus tenderness present. No frontal sinus tenderness.      Mouth/Throat:      Pharynx: Uvula midline. No posterior oropharyngeal erythema.      Tonsils: No tonsillar exudate.   Eyes:      Pupils: Pupils are equal, round, and reactive to light.   Cardiovascular:      Rate and Rhythm: Normal rate and regular rhythm.      Heart sounds: Normal heart sounds. No murmur heard.    No friction rub. No gallop.   Pulmonary:      Effort: Pulmonary effort is normal. No respiratory distress.      Breath sounds: Normal breath sounds. No wheezing or rales.   Musculoskeletal:      Cervical back: Neck supple.   Lymphadenopathy:      Cervical: No cervical adenopathy.   Skin:     " General: Skin is warm and dry.   Neurological:      Mental Status: She is alert and oriented to person, place, and time.        Result Review :                  Assessment and Plan   Diagnoses and all orders for this visit:    1. Acute non-recurrent maxillary sinusitis (Primary)  -     POCT SARS-CoV-2 Antigen DA + Flu    Other orders  -     FLUoxetine (PROzac) 40 MG capsule; Take 1 capsule by mouth Daily.  Dispense: 90 capsule; Refill: 1  -     oxybutynin XL (Ditropan XL) 10 MG 24 hr tablet; Take 1 tablet by mouth Daily.  Dispense: 90 tablet; Refill: 1  -     amoxicillin-clavulanate (Augmentin) 875-125 MG per tablet; Take 1 tablet by mouth Every 12 (Twelve) Hours.  Dispense: 14 tablet; Refill: 0      Sinusitis  The patient has had influenza and COVID-19 back to back. Her congestion and cough never fully resolved. The patient was prescribed Augmentin twice daily as prescribed. She was recommended to monitor her symptoms and to follow up if her symptoms do not improve.     She had been concerned about lymphadenopathy on the left side of her neck. No evidence of lymphadenopathy is indicated today and she is nontender. Overactive bladder, depression,  and anxiety have been discussed with the patient and refills of her medication have been sent to her preferred pharmacy.       Follow Up   No follow-ups on file.  Patient was given instructions and counseling regarding her condition or for health maintenance advice. Please see specific information pulled into the AVS if appropriate.       Transcribed from ambient dictation for ANIBAL Gilmore by Rosamaria Lee.  01/13/23   13:29 EST    Patient or patient representative verbalized consent to the visit recording.  I have personally performed the services described in this document as transcribed by the above individual, and it is both accurate and complete.

## 2023-03-26 NOTE — TELEPHONE ENCOUNTER
Patient needs to give an alternative for [facility] insurance will not cover it and PA was denied.  
normal (ped)...

## 2023-07-28 RX ORDER — OXYBUTYNIN CHLORIDE 10 MG/1
TABLET, EXTENDED RELEASE ORAL
Qty: 30 TABLET | Refills: 1 | Status: SHIPPED | OUTPATIENT
Start: 2023-07-28

## 2023-12-04 ENCOUNTER — OFFICE VISIT (OUTPATIENT)
Dept: FAMILY MEDICINE CLINIC | Facility: CLINIC | Age: 53
End: 2023-12-04
Payer: COMMERCIAL

## 2023-12-04 VITALS
DIASTOLIC BLOOD PRESSURE: 60 MMHG | TEMPERATURE: 97.7 F | HEART RATE: 79 BPM | SYSTOLIC BLOOD PRESSURE: 128 MMHG | HEIGHT: 68 IN | WEIGHT: 211 LBS | OXYGEN SATURATION: 100 % | RESPIRATION RATE: 14 BRPM | BODY MASS INDEX: 31.98 KG/M2

## 2023-12-04 DIAGNOSIS — G35 MS (MULTIPLE SCLEROSIS): ICD-10-CM

## 2023-12-04 DIAGNOSIS — F41.8 SITUATIONAL ANXIETY: Primary | ICD-10-CM

## 2023-12-04 DIAGNOSIS — G35 EXACERBATION OF MULTIPLE SCLEROSIS: ICD-10-CM

## 2023-12-04 DIAGNOSIS — Z79.899 HIGH RISK MEDICATION USE: ICD-10-CM

## 2023-12-04 DIAGNOSIS — N88.9 CERVICAL LESION: ICD-10-CM

## 2023-12-04 PROCEDURE — 99214 OFFICE O/P EST MOD 30 MIN: CPT | Performed by: NURSE PRACTITIONER

## 2023-12-04 RX ORDER — CLONAZEPAM 0.5 MG/1
TABLET ORAL
Qty: 20 TABLET | Refills: 0 | Status: SHIPPED | OUTPATIENT
Start: 2023-12-04

## 2023-12-04 RX ORDER — FLUOXETINE HYDROCHLORIDE 20 MG/1
20 CAPSULE ORAL DAILY
Qty: 90 CAPSULE | Refills: 1 | Status: SHIPPED | OUTPATIENT
Start: 2023-12-04

## 2023-12-04 NOTE — PATIENT INSTRUCTIONS
Discharge instructions    Clonazepam as a tool 1/2 to 1 tablet daily as needed for situational anxiety it last quite a bit of time 1/2 to 1-day generally so caution  Start fluoxetine in the morning 20 mg daily may take 3 to 6 weeks for full effect  Meditation bright lights, pleasant lights, pleasant aromas pleasant music,   (, good communication family friends, consider second opinions as well as [facility] etc. along the way, stay on top of everything, we should be aggressive with treatment of your autoimmune disease, which is your greatest risk and try to put the risk of the medication in perspective which is difficult to do caution with any form that can be helpful but also can be quite harmful as well.

## 2023-12-07 PROBLEM — N88.9 CERVICAL LESION: Status: ACTIVE | Noted: 2023-12-07

## 2023-12-07 PROBLEM — Z79.899 HIGH RISK MEDICATION USE: Status: ACTIVE | Noted: 2023-12-07

## 2023-12-07 NOTE — PROGRESS NOTES
"Chief Complaint  Annual Exam, Depression, and Anxiety    Subjective        Jacqueline Briceno presents to [facility]  History of Present Illness  Pleasant patient here today follow-up anxiety depression, increased situational worries, she has some paresthesias in her legs,  Depression  Anxiety      Her past medical history is significant for depression.       Objective   Vital Signs:  /60   Pulse 79   Temp 97.7 °F (36.5 °C) (Temporal)   Resp 14   Ht 172.7 cm (67.99\")   Wt 95.7 kg (211 lb)   SpO2 100%   BMI 32.09 kg/m²   Estimated body mass index is 32.09 kg/m² as calculated from the following:    Height as of this encounter: 172.7 cm (67.99\").    Weight as of this encounter: 95.7 kg (211 lb).          Physical Exam  Constitutional:       General: She is not in acute distress.     Appearance: Normal appearance. She is not ill-appearing, toxic-appearing or diaphoretic.   Eyes:      Conjunctiva/sclera: Conjunctivae normal.      Pupils: Pupils are equal, round, and reactive to light.   Pulmonary:      Effort: Pulmonary effort is normal. No respiratory distress.      Breath sounds: No stridor.   Musculoskeletal:      Cervical back: Normal range of motion.   Skin:     General: Skin is warm and dry.   Neurological:      General: No focal deficit present.      Mental Status: She is alert and oriented to person, place, and time. Mental status is at baseline.      Cranial Nerves: No cranial nerve deficit.      Sensory: No sensory deficit.      Motor: No weakness.      Coordination: Coordination normal.      Gait: Gait normal.   Psychiatric:         Mood and Affect: Mood normal.         Behavior: Behavior normal.         Thought Content: Thought content normal.         Judgment: Judgment normal.      Comments: Tearful at times good eye contact appropriate for situation and conversation  No faustina no pressured speech.        Result Review :                Assessment and Plan   Diagnoses and " all orders for this visit:    1. Situational anxiety (Primary)  Comments:  Exacerbation of MS with progressive cervical and thoracic lesions  Orders:  -     clonazePAM (KlonoPIN) 0.5 MG tablet; 1/2 to 1 tablet by mouth daily as needed for feelings of panic and anxiety caution could cause sedation falls do not drive if sedation  Dispense: 20 tablet; Refill: 0  -     ToxASSURE Select 13 (MW) - Urine, Clean Catch    2. High risk medication use  -     clonazePAM (KlonoPIN) 0.5 MG tablet; 1/2 to 1 tablet by mouth daily as needed for feelings of panic and anxiety caution could cause sedation falls do not drive if sedation  Dispense: 20 tablet; Refill: 0  -     ToxASSURE Select 13 (MW) - Urine, Clean Catch    3. Exacerbation of multiple sclerosis    4. MS (multiple sclerosis)    5. Cervical lesion  Comments:  Demyelinization lesions    Other orders  -     FLUoxetine (PROzac) 20 MG capsule; Take 1 capsule by mouth Daily.  Dispense: 90 capsule; Refill: 1           I spent 30  minutes caring for Jacqueline on this date of service. This time includes time spent by me in the following activities:preparing for the visit, reviewing tests, obtaining and/or reviewing a separately obtained history, performing a medically appropriate examination and/or evaluation , counseling and educating the patient/family/caregiver, ordering medications, tests, or procedures, documenting information in the medical record, and care coordination  Follow Up   Return in about 2 weeks (around 12/18/2023).  Patient was given instructions and counseling regarding her condition or for health maintenance advice. Please see specific information pulled into the AVS if appropriate.   Acute moderate to moderate severe anxiety, situational, dealing with the realization that her multiple sclerosis is worsening  Progressive cervical lesions  She is up-to-date with neurology has close contact and medication change presently no focal weakness  Risk-benefit  benzodiazepines discussed other medications as well acutely I think this is the best fit for her start with 1/2 tablet caution could last glucose of substantial sedation lowest effective dose  Will utilize until fluoxetine has a chance to produce more long-lasting antianxiety effects.  With close follow-up UDS today, controlled subs agreement Lee evaluated and cleared  Patient Instructions   Discharge instructions    Clonazepam as a tool 1/2 to 1 tablet daily as needed for situational anxiety it last quite a bit of time 1/2 to 1-day generally so caution  Start fluoxetine in the morning 20 mg daily may take 3 to 6 weeks for full effect  Meditation bright lights, pleasant lights, pleasant aromas pleasant music,   (, good communication family friends, consider second opinions as well as [facility] etc. along the way, stay on top of everything, we should be aggressive with treatment of your autoimmune disease, which is your greatest risk and try to put the risk of the medication in perspective which is difficult to do caution with any form that can be helpful but also can be quite harmful as well.

## 2023-12-09 LAB — DRUGS UR: NORMAL

## 2024-01-10 ENCOUNTER — OFFICE VISIT (OUTPATIENT)
Dept: FAMILY MEDICINE CLINIC | Facility: CLINIC | Age: 54
End: 2024-01-10
Payer: COMMERCIAL

## 2024-01-10 VITALS
BODY MASS INDEX: 31.22 KG/M2 | DIASTOLIC BLOOD PRESSURE: 82 MMHG | WEIGHT: 206 LBS | SYSTOLIC BLOOD PRESSURE: 117 MMHG | HEART RATE: 59 BPM | HEIGHT: 68 IN | RESPIRATION RATE: 16 BRPM | TEMPERATURE: 96.8 F | OXYGEN SATURATION: 100 %

## 2024-01-10 DIAGNOSIS — E66.09 CLASS 1 OBESITY DUE TO EXCESS CALORIES WITH SERIOUS COMORBIDITY AND BODY MASS INDEX (BMI) OF 31.0 TO 31.9 IN ADULT: ICD-10-CM

## 2024-01-10 DIAGNOSIS — G35 MS (MULTIPLE SCLEROSIS): Primary | ICD-10-CM

## 2024-01-10 DIAGNOSIS — F32.0 MAJOR DEPRESSIVE DISORDER, SINGLE EPISODE, MILD: ICD-10-CM

## 2024-01-10 DIAGNOSIS — F41.8 SITUATIONAL ANXIETY: ICD-10-CM

## 2024-01-10 PROCEDURE — 99214 OFFICE O/P EST MOD 30 MIN: CPT | Performed by: NURSE PRACTITIONER

## 2024-01-10 RX ORDER — SEMAGLUTIDE 0.25 MG/.5ML
0.25 INJECTION, SOLUTION SUBCUTANEOUS WEEKLY
Qty: 2 ML | Refills: 0 | Status: SHIPPED | OUTPATIENT
Start: 2024-01-10

## 2024-01-10 RX ORDER — SIPONIMOD 2 MG/1
2 TABLET, FILM COATED ORAL DAILY
COMMUNITY
Start: 2023-12-28

## 2024-01-10 RX ORDER — FLUOXETINE HYDROCHLORIDE 40 MG/1
40 CAPSULE ORAL DAILY
Qty: 90 CAPSULE | Refills: 0 | Status: SHIPPED | OUTPATIENT
Start: 2024-01-10 | End: 2024-01-16

## 2024-01-10 NOTE — PATIENT INSTRUCTIONS
Discharge instructions  Increase fluoxetine to 40 mg daily continue present plan follow-up neurology, should you have new weakness incontinence gait difficulty emergency room  More information about bariatric surgery gastric sleeve Dr. Weiss, also [facility] prior authorization    Intermittent fasting lots of fluids 64 ounces water daily  Calories 1200 daily approximately, slow steady changes over the next 6 months    Follow-up in 6 weeks sooner if any difficulties.

## 2024-01-11 LAB
ALBUMIN SERPL-MCNC: 4.7 G/DL (ref 3.5–5.2)
ALBUMIN/GLOB SERPL: 1.8 G/DL
ALP SERPL-CCNC: 76 U/L (ref 39–117)
ALT SERPL-CCNC: 9 U/L (ref 1–33)
APPEARANCE UR: CLEAR
AST SERPL-CCNC: 11 U/L (ref 1–32)
BACTERIA #/AREA URNS HPF: ABNORMAL /HPF
BASOPHILS # BLD AUTO: 0.07 10*3/MM3 (ref 0–0.2)
BASOPHILS NFR BLD AUTO: 1.5 % (ref 0–1.5)
BILIRUB SERPL-MCNC: 0.4 MG/DL (ref 0–1.2)
BILIRUB UR QL STRIP: NEGATIVE
BUN SERPL-MCNC: 15 MG/DL (ref 6–20)
BUN/CREAT SERPL: 14.6 (ref 7–25)
CALCIUM SERPL-MCNC: 9.8 MG/DL (ref 8.6–10.5)
CASTS URNS MICRO: ABNORMAL
CHLORIDE SERPL-SCNC: 100 MMOL/L (ref 98–107)
CHOLEST SERPL-MCNC: 311 MG/DL (ref 0–200)
CO2 SERPL-SCNC: 27.1 MMOL/L (ref 22–29)
COLOR UR: YELLOW
CREAT SERPL-MCNC: 1.03 MG/DL (ref 0.57–1)
EGFRCR SERPLBLD CKD-EPI 2021: 66 ML/MIN/1.73
EOSINOPHIL # BLD AUTO: 0.09 10*3/MM3 (ref 0–0.4)
EOSINOPHIL NFR BLD AUTO: 2 % (ref 0.3–6.2)
EPI CELLS #/AREA URNS HPF: ABNORMAL /HPF
ERYTHROCYTE [DISTWIDTH] IN BLOOD BY AUTOMATED COUNT: 13 % (ref 12.3–15.4)
ERYTHROCYTE [SEDIMENTATION RATE] IN BLOOD BY WESTERGREN METHOD: 3 MM/HR (ref 0–30)
GLOBULIN SER CALC-MCNC: 2.6 GM/DL
GLUCOSE SERPL-MCNC: 85 MG/DL (ref 65–99)
GLUCOSE UR QL STRIP: NEGATIVE
HCT VFR BLD AUTO: 38.8 % (ref 34–46.6)
HDLC SERPL-MCNC: 73 MG/DL (ref 40–60)
HGB BLD-MCNC: 12.8 G/DL (ref 12–15.9)
HGB UR QL STRIP: NEGATIVE
IMM GRANULOCYTES # BLD AUTO: 0.01 10*3/MM3 (ref 0–0.05)
IMM GRANULOCYTES NFR BLD AUTO: 0.2 % (ref 0–0.5)
KETONES UR QL STRIP: NEGATIVE
LDLC SERPL CALC-MCNC: 228 MG/DL (ref 0–100)
LDLC/HDLC SERPL: 3.07 {RATIO}
LEUKOCYTE ESTERASE UR QL STRIP: ABNORMAL
LYMPHOCYTES # BLD AUTO: 1.36 10*3/MM3 (ref 0.7–3.1)
LYMPHOCYTES NFR BLD AUTO: 29.7 % (ref 19.6–45.3)
MCH RBC QN AUTO: 29.8 PG (ref 26.6–33)
MCHC RBC AUTO-ENTMCNC: 33 G/DL (ref 31.5–35.7)
MCV RBC AUTO: 90.2 FL (ref 79–97)
MONOCYTES # BLD AUTO: 0.3 10*3/MM3 (ref 0.1–0.9)
MONOCYTES NFR BLD AUTO: 6.6 % (ref 5–12)
NEUTROPHILS # BLD AUTO: 2.75 10*3/MM3 (ref 1.7–7)
NEUTROPHILS NFR BLD AUTO: 60 % (ref 42.7–76)
NITRITE UR QL STRIP: NEGATIVE
NRBC BLD AUTO-RTO: 0 /100 WBC (ref 0–0.2)
PH UR STRIP: 6 [PH] (ref 5–8)
PLATELET # BLD AUTO: 186 10*3/MM3 (ref 140–450)
POTASSIUM SERPL-SCNC: 4 MMOL/L (ref 3.5–5.2)
PROT SERPL-MCNC: 7.3 G/DL (ref 6–8.5)
PROT UR QL STRIP: NEGATIVE
RBC # BLD AUTO: 4.3 10*6/MM3 (ref 3.77–5.28)
RBC #/AREA URNS HPF: ABNORMAL /HPF
SODIUM SERPL-SCNC: 138 MMOL/L (ref 136–145)
SP GR UR STRIP: 1.01 (ref 1–1.03)
TRIGL SERPL-MCNC: 68 MG/DL (ref 0–150)
TSH SERPL DL<=0.005 MIU/L-ACNC: 1.2 UIU/ML (ref 0.27–4.2)
UROBILINOGEN UR STRIP-MCNC: ABNORMAL MG/DL
VLDLC SERPL CALC-MCNC: 10 MG/DL (ref 5–40)
WBC # BLD AUTO: 4.58 10*3/MM3 (ref 3.4–10.8)
WBC #/AREA URNS HPF: ABNORMAL /HPF

## 2024-01-15 NOTE — PROGRESS NOTES
"Chief Complaint  Weight Loss (Pt interested weightloss and has a spot on rt leg that needs to be looked at )    Subjective        Jacqueline Briceno presents to [facility]  History of Present Illness  Pleasant patient here today follow-up imbalance, with lesions which are new compared to several years ago of the C-spine although symptoms presently stable, new medication improved to start, she is hopeful, recently fluoxetine 20 mg, helped some, wants to increase,  Weight gain, BMI elevated obesity,  Whether other options may be available Wegov[facility] needs a starter pack  Weight Loss        Objective   Vital Signs:  /82   Pulse 59   Temp 96.8 °F (36 °C) (Infrared)   Resp 16   Ht 172.7 cm (67.99\")   Wt 93.4 kg (206 lb)   SpO2 100%   BMI 31.33 kg/m²   Estimated body mass index is 31.33 kg/m² as calculated from the following:    Height as of this encounter: 172.7 cm (67.99\").    Weight as of this encounter: 93.4 kg (206 lb).            Physical Exam   Result Review :                Assessment and Plan   Diagnoses and all orders for this visit:    1. MS (multiple sclerosis) (Primary)  -     Lipid Panel With LDL / HDL Ratio  -     Sedimentation Rate  -     CBC & Differential  -     Comprehensive Metabolic Panel  -     TSH Rfx On Abnormal To Free T4  -     Urinalysis With Microscopic If Indicated (No Culture) - Urine, Clean Catch    2. Situational anxiety  -     Lipid Panel With LDL / HDL Ratio  -     Sedimentation Rate  -     CBC & Differential  -     Comprehensive Metabolic Panel  -     TSH Rfx On Abnormal To Free T4  -     Urinalysis With Microscopic If Indicated (No Culture) - Urine, Clean Catch    3. Major depressive disorder, single episode, mild  -     Lipid Panel With LDL / HDL Ratio  -     Sedimentation Rate  -     CBC & Differential  -     Comprehensive Metabolic Panel  -     TSH Rfx On Abnormal To Free T4  -     Urinalysis With Microscopic If Indicated (No Culture) - Urine, Clean " Catch    4. Class 1 obesity due to excess calories with serious comorbidity and body mass index (BMI) of 31.0 to 31.9 in adult  -     Lipid Panel With LDL / HDL Ratio  -     Sedimentation Rate  -     CBC & Differential  -     Comprehensive Metabolic Panel  -     TSH Rfx On Abnormal To Free T4  -     Urinalysis With Microscopic If Indicated (No Culture) - Urine, Clean Catch  -     Ambulatory Referral to Bariatric Surgery  -     Semaglutide-Weight Management (Wegovy) 0.25 MG/0.5ML solution auto-injector; Inject 0.25 mg under the skin into the appropriate area as directed 1 (One) Time Per Week.  Dispense: 2 mL; Refill: 0    Other orders  -     CBD oil (cannabidiol) capsule; Take 1 capsule by mouth Daily. OTC hemp derivitive for MS  -     FLUoxetine (PROzac) 40 MG capsule; Take 1 capsule by mouth Daily.  Dispense: 90 capsule; Refill: 0  -     Microscopic Examination -           I spent 30  minutes caring for Jacqueline on this date of service. This time includes time spent by me in the following activities:preparing for the visit, reviewing tests, obtaining and/or reviewing a separately obtained history, performing a medically appropriate examination and/or evaluation , counseling and educating the patient/family/caregiver, ordering medications, tests, or procedures, documenting information in the medical record, and care coordination  Follow Up   Return in about 6 weeks (around 2/21/2024).  Patient was given instructions and counseling regarding her condition or for health maintenance advice. Please see specific information pulled into the AVS if appropriate.         Increase fluoxetine, discussed MS, previously had testing to rule out in nmo or Devic's disease, this was done twice,  She has good follow-up and feels better reassured that her symptoms are stable  Patient Instructions   Discharge instructions  Increase fluoxetine to 40 mg daily continue present plan follow-up neurology, should you have new weakness  incontinence gait difficulty emergency room  More information about bariatric surgery gastric sleeve Dr. Weiss, also [facility] prior authorization    Intermittent fasting lots of fluids 64 ounces water daily  Calories 1200 daily approximately, slow steady changes over the next 6 months    Follow-up in 6 weeks sooner if any difficulties.

## 2024-01-16 RX ORDER — VENLAFAXINE HYDROCHLORIDE 37.5 MG/1
37.5 CAPSULE, EXTENDED RELEASE ORAL
Qty: 30 CAPSULE | Refills: 0 | Status: SHIPPED | OUTPATIENT
Start: 2024-01-16

## 2024-01-18 ENCOUNTER — OFFICE VISIT (OUTPATIENT)
Dept: OBSTETRICS AND GYNECOLOGY | Facility: CLINIC | Age: 54
End: 2024-01-18
Payer: COMMERCIAL

## 2024-01-18 VITALS
DIASTOLIC BLOOD PRESSURE: 68 MMHG | SYSTOLIC BLOOD PRESSURE: 112 MMHG | WEIGHT: 207 LBS | HEIGHT: 68 IN | BODY MASS INDEX: 31.37 KG/M2

## 2024-01-18 DIAGNOSIS — Z12.11 SCREENING FOR COLON CANCER: ICD-10-CM

## 2024-01-18 DIAGNOSIS — T83.32XA INTRAUTERINE CONTRACEPTIVE DEVICE THREADS LOST, INITIAL ENCOUNTER: ICD-10-CM

## 2024-01-18 DIAGNOSIS — D21.9 FIBROIDS: ICD-10-CM

## 2024-01-18 DIAGNOSIS — Z12.31 ENCOUNTER FOR SCREENING MAMMOGRAM FOR MALIGNANT NEOPLASM OF BREAST: ICD-10-CM

## 2024-01-18 DIAGNOSIS — Z11.51 SPECIAL SCREENING EXAMINATION FOR HUMAN PAPILLOMAVIRUS (HPV): ICD-10-CM

## 2024-01-18 DIAGNOSIS — Z01.419 PAP SMEAR, AS PART OF ROUTINE GYNECOLOGICAL EXAMINATION: Primary | ICD-10-CM

## 2024-01-18 DIAGNOSIS — Z01.419 ROUTINE GYNECOLOGICAL EXAMINATION: ICD-10-CM

## 2024-01-18 NOTE — PROGRESS NOTES
GYN Annual Exam     CC- Here for annual exam.     Jacqueline Briceno is a 53 y.o. female est pt who presents for annual well woman exam . She denies any  VB. She thought we removed her IUD but it is not documented and no strings were seen on exam so we got an US today that shows a 6.3 cm AV uterus with an EL= 0.7 cm and no IUD is seen. She has a posterior fibroid that measures 1.8 x 1.7 cm, fundal 1.3 x 1.0 cm and anterior fibroid measuring 2.3 x 1.9 cm. Her ovaries are normal. Her US was compared to her scan on 11/3/2021.     OB History          2    Para   1    Term   1            AB   1    Living   1         SAB        IAB   1    Ectopic        Molar        Multiple        Live Births              Obstetric Comments   1 C/S  1 VIP               Menarche: 15  Menopause: 52   HRT: none  Current contraception:   History of abnormal Pap smear: yes -  intermittent abnormals  History of abnormal mammogram: no  Family history of uterine, colon or ovarian cancer: no  Family history of breast cancer: yes, second cousin  H/o STDs: none  Last pap: 2020- normal pap/HPV  Gardasil:missed  TIANNA: none  Fibroids    Health Maintenance   Topic Date Due    TDAP/TD VACCINES (1 - Tdap) Never done    HEPATITIS C SCREENING  Never done    ANNUAL PHYSICAL  2019    ZOSTER VACCINE (1 of 2) Never done    Annual Gynecologic Pelvic and Breast Exam  2022    INFLUENZA VACCINE  Never done    COVID-19 Vaccine (3 - -24 season) 2023    MAMMOGRAM  2024    COLORECTAL CANCER SCREENING  12/15/2024    BMI FOLLOWUP  2025    PAP SMEAR  2027    Pneumococcal Vaccine 0-64  Aged Out       Past Medical History:   Diagnosis Date    Abnormal Pap smear of cervix     intermittently abnl paps    Anxiety     Depression     Fibroid     High risk medication use 2023    Injury of back     MS (multiple sclerosis)     OAB (overactive bladder)        Past Surgical History:   Procedure Laterality Date     "AUGMENTATION MAMMAPLASTY       SECTION      x 1    D & C WITH SUCTION      x 1    MANDIBLE SURGERY      WISDOM TOOTH EXTRACTION           Current Outpatient Medications:     CBD oil (cannabidiol) capsule, Take 1 capsule by mouth Daily. OTC hemp derivitive for MS, Disp: , Rfl:     clonazePAM (KlonoPIN) 0.5 MG tablet, 1/2 to 1 tablet by mouth daily as needed for feelings of panic and anxiety caution could cause sedation falls do not drive if sedation (Patient not taking: Reported on 1/10/2024), Disp: 20 tablet, Rfl: 0    estradiol (ESTRACE) 0.1 MG/GM vaginal cream, Insert 1 applicator into the vagina 2 (Two) Times a Week., Disp: 45 g, Rfl: 6    Mayzent 2 MG tablet, Take 1 tablet by mouth Daily., Disp: , Rfl:     Mirabegron ER (MYRBETRIQ) 25 MG tablet sustained-release 24 hour 24 hr tablet, Take 1 tablet by mouth Daily., Disp: , Rfl:     Semaglutide-Weight Management (Wegovy) 0.25 MG/0.5ML solution auto-injector, Inject 0.25 mg under the skin into the appropriate area as directed 1 (One) Time Per Week., Disp: 2 mL, Rfl: 0    Semaglutide-Weight Management (Wegovy) 0.5 MG/0.5ML solution auto-injector, Inject 0.5 mL under the skin into the appropriate area as directed 1 (One) Time Per Week., Disp: 4 mL, Rfl: 0    venlafaxine XR (Effexor XR) 37.5 MG 24 hr capsule, Take 1 capsule by mouth every night at bedtime., Disp: 30 capsule, Rfl: 0    Allergies   Allergen Reactions    Phenyltoloxamine-Acetaminophen Itching     Some type IV pain med causes itching       Social History     Tobacco Use    Smoking status: Former    Smokeless tobacco: Never   Vaping Use    Vaping Use: Never used   Substance Use Topics    Alcohol use: Yes     Alcohol/week: 9.0 standard drinks of alcohol     Types: 2 Glasses of wine, 4 Cans of beer, 3 Shots of liquor per week     Comment: pt says she \"used to have an alcohol problem\"    Drug use: No       Family History   Problem Relation Age of Onset    No Known Problems Father     No Known Problems " "Mother     Breast cancer Other     Ovarian cancer Neg Hx     Colon cancer Neg Hx     Deep vein thrombosis Neg Hx     Pulmonary embolism Neg Hx     Uterine cancer Neg Hx        Review of Systems   Constitutional:  Negative for activity change, appetite change, fatigue, fever and unexpected weight change.   Eyes:  Negative for photophobia and visual disturbance.   Respiratory:  Negative for cough and shortness of breath.    Cardiovascular:  Negative for chest pain and palpitations.   Gastrointestinal:  Negative for abdominal distention, abdominal pain, constipation, diarrhea and nausea.   Endocrine: Negative for cold intolerance and heat intolerance.   Genitourinary:  Negative for dyspareunia, dysuria, frequency, menstrual problem, pelvic pain, urgency, vaginal bleeding, vaginal discharge and vaginal pain.   Musculoskeletal:  Negative for back pain.   Skin:  Negative for color change and rash.   Neurological:  Negative for headaches.   Hematological:  Negative for adenopathy. Does not bruise/bleed easily.   Psychiatric/Behavioral:  Negative for dysphoric mood. The patient is not nervous/anxious.        /68   Ht 172.7 cm (67.99\")   Wt 93.9 kg (207 lb)   LMP  (LMP Unknown)   BMI 31.48 kg/m²     Physical Exam   Constitutional: She is oriented to person, place, and time. She appears well-developed.   HENT:   Head: Normocephalic and atraumatic.   Eyes: Conjunctivae are normal. No scleral icterus.   Neck: No thyromegaly present.   Cardiovascular: Normal rate and regular rhythm.   Pulmonary/Chest: Effort normal and breath sounds normal. Right breast exhibits no inverted nipple, no mass, no nipple discharge, no skin change and no tenderness. Left breast exhibits no inverted nipple, no mass, no nipple discharge, no skin change and no tenderness.   Implants noted   Abdominal: Soft. Normal appearance and bowel sounds are normal. She exhibits no distension and no mass. There is no abdominal tenderness. There is no " rebound and no guarding. No hernia.   Genitourinary:    Pelvic exam was performed with patient supine.   There is no rash, tenderness or lesion on the right labia. There is no rash, tenderness or lesion on the left labia. Uterus is not deviated, not enlarged, not fixed and not tender. Cervix exhibits no motion tenderness, no discharge and no friability. Right adnexum displays no mass, no tenderness and no fullness. Left adnexum displays no mass, no tenderness and no fullness.    No vaginal discharge, erythema, tenderness or bleeding.   No erythema, tenderness or bleeding in the vagina.    No foreign body in the vagina.      No signs of injury in the vagina.     Neurological: She is alert and oriented to person, place, and time.   Skin: Skin is warm and dry.   Psychiatric: Her behavior is normal. Mood, judgment and thought content normal.   Nursing note and vitals reviewed.         Assessment/Plan    1) GYN HM: normal pap/HPV 8/2020, check pap/HPVSBE demonstrated and encouraged.  2) STD screening: declines. Condoms encouraged.  3) Contraception:Mirena removed per pt. Not seen on US.   4) Family Planning: family planning: childbearing completed, encourage folic acid daily  5) Diet and Exercise discussed  6) Smoking Status: No  7) C scope- pt declines, will send Cologuard. Pt is aware that these tests are not equivalent in the detection of colon cancer  8) MMG- UTD 8/2022 B1. , schedule MMG now  9) DEXA- plan age 57  10) Fibroids or smooth muscle tumors that are noncancerous and arise out of the wall of the uterus.  There made by one muscle saw the decides to start growing.  Fibroids are common in the population and are often asymptomatic.  Larger fibroids can cause heavy cycles, pelvic pressure or fullness as well as pain during the menstrual cycle.  Fibroids generally grow slowly until menopause, and at that time stop growing, although they will generally not get much smaller after cycle stop.  11) Parts of this  document have been copied or forwarded from her previous visits and have been reviewed, updated and edited as indicated.   12)Follow up prn and 1 year annual.       Diagnoses and all orders for this visit:    1. Pap smear, as part of routine gynecological examination (Primary)  -     IGP, Apt HPV,rfx 16 / 18,45    2. Routine gynecological examination  -     Cancel: POC Urinalysis Dipstick  -     IGP, Apt HPV,rfx 16 / 18,45    3. Special screening examination for human papillomavirus (HPV)  -     IGP, Apt HPV,rfx 16 / 18,45    4. Screening for colon cancer  -     Cologuard - Stool, Per Rectum; Future    5. Encounter for screening mammogram for malignant neoplasm of breast  -     Mammo Screening Digital Tomosynthesis Bilateral With CAD; Future    6. Intrauterine contraceptive device threads lost, initial encounter    7. Fibroids          Bronwyn Kaur MD  1/18/2024  19:24 EST

## 2024-01-22 LAB
CYTOLOGIST CVX/VAG CYTO: NORMAL
CYTOLOGY CVX/VAG DOC CYTO: NORMAL
CYTOLOGY CVX/VAG DOC THIN PREP: NORMAL
DX ICD CODE: NORMAL
HIV 1 & 2 AB SER-IMP: NORMAL
HPV I/H RISK 4 DNA CVX QL PROBE+SIG AMP: NEGATIVE
OTHER STN SPEC: NORMAL
STAT OF ADQ CVX/VAG CYTO-IMP: NORMAL

## 2024-01-30 RX ORDER — SEMAGLUTIDE 0.5 MG/.5ML
0.5 INJECTION, SOLUTION SUBCUTANEOUS WEEKLY
Qty: 4 ML | Refills: 0 | Status: SHIPPED | OUTPATIENT
Start: 2024-01-30

## 2024-01-30 RX ORDER — ESTRADIOL 0.1 MG/G
1 CREAM VAGINAL 2 TIMES WEEKLY
Qty: 45 G | Refills: 6 | Status: SHIPPED | OUTPATIENT
Start: 2024-02-01

## 2024-02-21 RX ORDER — VENLAFAXINE HYDROCHLORIDE 75 MG/1
75 CAPSULE, EXTENDED RELEASE ORAL
Qty: 90 CAPSULE | Refills: 0 | Status: SHIPPED | OUTPATIENT
Start: 2024-02-21

## 2024-02-21 NOTE — TELEPHONE ENCOUNTER
Rx Refill Note  Requested Prescriptions     Pending Prescriptions Disp Refills    venlafaxine XR (EFFEXOR-XR) 37.5 MG 24 hr capsule [Pharmacy Med Name: VENLAFAXINE HCL ER 37.5 MG CAP] 30 capsule 0     Sig: TAKE 1 CAPSULE BY MOUTH EVERY NIGHT AT BEDTIME      Last office visit with prescribing clinician: 1/10/2024   Last telemedicine visit with prescribing clinician: Visit date not found   Next office visit with prescribing clinician: Visit date not found                         Would you like a call back once the refill request has been completed: [] Yes [] No    If the office needs to give you a call back, can they leave a voicemail: [] Yes [] No    Debra Gallagher MA  02/21/24, 07:57 EST

## 2024-02-22 ENCOUNTER — OFFICE VISIT (OUTPATIENT)
Dept: OBSTETRICS AND GYNECOLOGY | Facility: CLINIC | Age: 54
End: 2024-02-22
Payer: COMMERCIAL

## 2024-02-22 VITALS
SYSTOLIC BLOOD PRESSURE: 114 MMHG | BODY MASS INDEX: 31.37 KG/M2 | DIASTOLIC BLOOD PRESSURE: 86 MMHG | HEIGHT: 68 IN | WEIGHT: 207 LBS

## 2024-02-22 DIAGNOSIS — N95.2 VAGINAL ATROPHY: ICD-10-CM

## 2024-02-22 DIAGNOSIS — Z11.3 SCREENING EXAMINATION FOR STD (SEXUALLY TRANSMITTED DISEASE): Primary | ICD-10-CM

## 2024-02-22 DIAGNOSIS — N89.8 VAGINAL IRRITATION: ICD-10-CM

## 2024-02-22 RX ORDER — DEXTROAMPHETAMINE SACCHARATE, AMPHETAMINE ASPARTATE, DEXTROAMPHETAMINE SULFATE AND AMPHETAMINE SULFATE 1.25; 1.25; 1.25; 1.25 MG/1; MG/1; MG/1; MG/1
10 TABLET ORAL
COMMUNITY
Start: 2024-02-15 | End: 2024-03-16

## 2024-02-22 NOTE — PROGRESS NOTES
"      Jacqueline Briceno is a 53 y.o. patient who presents for follow up of   Chief Complaint   Patient presents with    std screeing    vaginal irritation       54 yo est pt here for vaginal irritation after a new sexual relationship. She is also interested in STD screening. She started vaginal estrogen cream and thinks it is helping. She has her Cologuard kit at home and she needs to collect her sample. She also needs to get her MMG.       The following portions of the patient's history were reviewed and updated as appropriate: allergies, current medications and problem list.    Review of Systems   Constitutional:  Positive for activity change.   Genitourinary:  Positive for vaginal discharge and vaginal pain (irritation). Negative for dyspareunia and vaginal bleeding.       /86   Ht 172.7 cm (67.99\")   Wt 93.9 kg (207 lb)   LMP  (LMP Unknown)   BMI 31.48 kg/m²     Physical Exam  Vitals and nursing note reviewed. Exam conducted with a chaperone present.   Constitutional:       Appearance: Normal appearance. She is well-developed. She is obese.   HENT:      Head: Normocephalic and atraumatic.   Eyes:      General: No scleral icterus.     Conjunctiva/sclera: Conjunctivae normal.   Neck:      Thyroid: No thyromegaly.   Abdominal:      Palpations: Abdomen is soft.   Genitourinary:     General: Normal vulva.      Vagina: Vaginal discharge present.      Cervix: Normal.      Uterus: Normal.       Comments: Non specific white discharge in vault.   Mild atrophy noted  Skin:     General: Skin is warm and dry.   Neurological:      Mental Status: She is alert and oriented to person, place, and time.   Psychiatric:         Mood and Affect: Mood normal.         Behavior: Behavior normal.         Thought Content: Thought content normal.         Judgment: Judgment normal.         A/P:  1. Vaginal irritation- check NuSwab Y/M/L/B  2. Check STD panel. Enc condoms. Repeat screening in 6 months.   3. Vaginal atrophy- Patient " counseled that vaginal estrogen rings, creams and tablets are available and highly effective at treating local vaginal symptoms such as atrophy and vaginal dryness.  Vaginal estrogen does not cause uterine overgrowth and does not require a progestogen to protect the uterus.  Very small amounts of estrogen are absorbed systemically.  For patients with a history of an estrogen dependent cancer such as breast cancer, the decision to use local estrogen for local vaginal symptoms should be made after consultation with her oncologist.  Possible side effects include local irritation or burning and/or vaginal bleeding and should always be reported.  Cont E2 cream  4. Enc pt to get MMG and send in Cologuard  5. RHM- annual UTD 1/2024 normal pap/HPV  6. EPIC LOS calculator used to determine coding level.       Assessment & Plan   Diagnoses and all orders for this visit:    1. Screening examination for STD (sexually transmitted disease) (Primary)  -     POC Urinalysis Dipstick  -     Cancel: Chlamydia trachomatis, Neisseria gonorrhoeae, Trichomonas vaginalis, PCR - Urine, Urine, Random Void  -     Hepatitis B Surface Antigen  -     Hepatitis C Antibody  -     HIV-1 / O / 2 Ag / Antibody  -     HSV 1 & 2 - Specific Antibody, IgG  -     RPR, Rfx Qn RPR / Confirm TP  -     NuSwab VG+ - Swab, Vagina  -     Genital Mycoplasmas NENO, Swab - Swab, Vagina    2. Vaginal irritation    3. Vaginal atrophy                 No follow-ups on file.      Bronwyn Kaur MD    2/22/2024  09:48 EST

## 2024-02-23 LAB
HBV SURFACE AG SERPL QL IA: NEGATIVE
HCV IGG SERPL QL IA: NON REACTIVE
HIV 1+2 AB+HIV1 P24 AG SERPL QL IA: NON REACTIVE
HSV1 IGG SER IA-ACNC: <0.91 INDEX (ref 0–0.9)
HSV2 IGG SER IA-ACNC: 18.3 INDEX (ref 0–0.9)
RPR SER QL: NON REACTIVE

## 2024-02-23 NOTE — PROGRESS NOTES
PIP= blood portion of STD panel shows previous exposure to the genital herpes virus. If this is a new diagnosis, or if she has any questions, encourage her to make an appt to discuss

## 2024-02-25 LAB
A VAGINAE DNA VAG QL NAA+PROBE: ABNORMAL SCORE
BVAB2 DNA VAG QL NAA+PROBE: ABNORMAL SCORE
C ALBICANS DNA VAG QL NAA+PROBE: NEGATIVE
C GLABRATA DNA VAG QL NAA+PROBE: NEGATIVE
C TRACH DNA VAG QL NAA+PROBE: NEGATIVE
M GENITALIUM DNA SPEC QL NAA+PROBE: NEGATIVE
M HOMINIS DNA SPEC QL NAA+PROBE: NEGATIVE
MEGA1 DNA VAG QL NAA+PROBE: ABNORMAL SCORE
N GONORRHOEA DNA VAG QL NAA+PROBE: NEGATIVE
T VAGINALIS DNA VAG QL NAA+PROBE: NEGATIVE
UREAPLASMA DNA SPEC QL NAA+PROBE: POSITIVE

## 2024-02-26 RX ORDER — DOXYCYCLINE HYCLATE 100 MG
100 TABLET ORAL 2 TIMES DAILY
Qty: 14 TABLET | Refills: 0 | Status: SHIPPED | OUTPATIENT
Start: 2024-02-26 | End: 2024-03-04

## 2024-02-26 RX ORDER — METRONIDAZOLE 500 MG/1
500 TABLET ORAL 2 TIMES DAILY
Qty: 14 TABLET | Refills: 0 | Status: SHIPPED | OUTPATIENT
Start: 2024-02-26 | End: 2024-03-04

## 2024-02-29 RX ORDER — SEMAGLUTIDE 0.25 MG/.5ML
0.25 INJECTION, SOLUTION SUBCUTANEOUS WEEKLY
Qty: 2 ML | Refills: 0 | Status: SHIPPED | OUTPATIENT
Start: 2024-02-29

## 2024-03-04 ENCOUNTER — TELEPHONE (OUTPATIENT)
Dept: OBSTETRICS AND GYNECOLOGY | Facility: CLINIC | Age: 54
End: 2024-03-04

## 2024-03-04 NOTE — TELEPHONE ENCOUNTER
Caller: Jacqueline Briceno    Relationship to patient: Self    Best call back number: [phone]    Patient is needing: RETUTING CALL TO [facility]. NO ENCOUNTER IN CHART. PATIENT WOULD LIKE TO KNOW MORE ABOUT THE RESULTS. PATIENT WOULD LIKE TO KNOW WHAT THE PREVIOUS EXPOSURE WAS AND IF SHE NEEDS TO DO ANYTHING ABOUT IT. OKAY TO LEAVE INFO ON VM.      OKAY TO [facility]

## 2024-05-28 RX ORDER — VENLAFAXINE HYDROCHLORIDE 75 MG/1
75 CAPSULE, EXTENDED RELEASE ORAL
Qty: 90 CAPSULE | Refills: 0 | Status: SHIPPED | OUTPATIENT
Start: 2024-05-28

## 2024-05-28 NOTE — TELEPHONE ENCOUNTER
Rx Refill Note  Requested Prescriptions     Pending Prescriptions Disp Refills    venlafaxine XR (EFFEXOR-XR) 75 MG 24 hr capsule [Pharmacy Med Name: VENLAFAXINE HCL ER 75 MG CAP] 90 capsule 0     Sig: TAKE 1 CAPSULE BY MOUTH EVERY NIGHT AT BEDTIME      Last office visit with prescribing clinician: 1/10/2024   Last telemedicine visit with prescribing clinician: Visit date not found   Next office visit with prescribing clinician: Visit date not found                         Would you like a call back once the refill request has been completed: [] Yes [] No    If the office needs to give you a call back, can they leave a voicemail: [] Yes [] No    Debra Gallagher MA  05/28/24, 09:32 EDT

## 2024-09-16 RX ORDER — VENLAFAXINE HYDROCHLORIDE 75 MG/1
75 CAPSULE, EXTENDED RELEASE ORAL
Qty: 90 CAPSULE | Refills: 1 | Status: SHIPPED | OUTPATIENT
Start: 2024-09-16

## 2025-02-05 RX ORDER — HYDROXYZINE HYDROCHLORIDE 10 MG/1
10-20 TABLET, FILM COATED ORAL 3 TIMES DAILY PRN
Qty: 40 TABLET | Refills: 1 | Status: SHIPPED | OUTPATIENT
Start: 2025-02-05

## 2025-05-19 ENCOUNTER — OFFICE VISIT (OUTPATIENT)
Dept: OBSTETRICS AND GYNECOLOGY | Facility: CLINIC | Age: 55
End: 2025-05-19
Payer: COMMERCIAL

## 2025-05-19 VITALS
WEIGHT: 196.38 LBS | HEIGHT: 68 IN | BODY MASS INDEX: 29.76 KG/M2 | SYSTOLIC BLOOD PRESSURE: 132 MMHG | DIASTOLIC BLOOD PRESSURE: 80 MMHG

## 2025-05-19 DIAGNOSIS — Z12.31 ENCOUNTER FOR SCREENING MAMMOGRAM FOR MALIGNANT NEOPLASM OF BREAST: ICD-10-CM

## 2025-05-19 DIAGNOSIS — Z01.419 ROUTINE GYNECOLOGICAL EXAMINATION: Primary | ICD-10-CM

## 2025-05-19 DIAGNOSIS — B00.9 HSV-2 INFECTION: ICD-10-CM

## 2025-05-19 DIAGNOSIS — Z12.11 SCREENING FOR COLON CANCER: ICD-10-CM

## 2025-05-19 RX ORDER — DESVENLAFAXINE 50 MG/1
TABLET, FILM COATED, EXTENDED RELEASE ORAL
COMMUNITY
Start: 2025-05-07

## 2025-05-19 RX ORDER — DEXTROAMPHETAMINE SACCHARATE, AMPHETAMINE ASPARTATE, DEXTROAMPHETAMINE SULFATE AND AMPHETAMINE SULFATE 3.75; 3.75; 3.75; 3.75 MG/1; MG/1; MG/1; MG/1
15 TABLET ORAL
COMMUNITY
Start: 2025-02-21

## 2025-05-19 RX ORDER — NITROFURANTOIN 25; 75 MG/1; MG/1
CAPSULE ORAL
COMMUNITY
Start: 2025-05-15

## 2025-05-19 RX ORDER — VIBEGRON 75 MG/1
TABLET, FILM COATED ORAL
COMMUNITY
Start: 2025-05-14

## 2025-05-19 RX ORDER — MIRABEGRON 50 MG/1
50 TABLET, FILM COATED, EXTENDED RELEASE ORAL DAILY
COMMUNITY
Start: 2024-10-22

## 2025-05-19 RX ORDER — VALACYCLOVIR HYDROCHLORIDE 500 MG/1
500 TABLET, FILM COATED ORAL 2 TIMES DAILY
Qty: 6 TABLET | Refills: 6 | Status: SHIPPED | OUTPATIENT
Start: 2025-05-19 | End: 2025-05-22

## 2025-05-19 RX ORDER — PSEUDOEPHEDRINE HCL 120 MG
TABLET, EXTENDED RELEASE ORAL
COMMUNITY

## 2025-05-19 RX ORDER — HYDROXYZINE HYDROCHLORIDE 25 MG/1
25 TABLET, FILM COATED ORAL
COMMUNITY
Start: 2025-03-19

## 2025-05-19 RX ORDER — BUSPIRONE HYDROCHLORIDE 5 MG/1
TABLET ORAL
COMMUNITY
Start: 2025-04-24

## 2025-05-19 NOTE — PROGRESS NOTES
GYN Annual Exam     CC- Here for annual exam.     Jacqueline Briceno is a 54 y.o. female est pt who presents for annual well woman exam . She denies any  VB.  She has some insomnia, denies any HF or NS. We discussed a referral to sleep medicine and she declines for now. She has HSV 2 with rare outbreaks and needs a Valtrex Rx for prn use. She is greatly past due on MMG and Cologuard.   OB History          2    Para   1    Term   1            AB   1    Living   1         SAB        IAB   1    Ectopic        Molar        Multiple        Live Births              Obstetric Comments   1 C/S  1 VIP               Menarche: 15  Menopause: 52   HRT: none  Current contraception:   History of abnormal Pap smear: yes -  intermittent abnormals  History of abnormal mammogram: no  Family history of uterine, colon or ovarian cancer: no  Family history of breast cancer: yes, second cousin  H/o STDs: HSV 2   Last pap: 2024- normal pap/HPV  Gardasil:missed  TIANNA: none  Fibroids    Health Maintenance   Topic Date Due    TDAP/TD VACCINES (1 - Tdap) Never done    ANNUAL PHYSICAL  2019    Pneumococcal Vaccine 50+ (1 of 1 - PCV) Never done    ZOSTER VACCINE (1 of 2) Never done    MAMMOGRAM  2024    COVID-19 Vaccine (3 - -25 season) 2024    COLORECTAL CANCER SCREENING  12/15/2024    Annual Gynecologic Pelvic and Breast Exam  2025    INFLUENZA VACCINE  2025    PAP SMEAR  2027    HEPATITIS C SCREENING  Completed       Past Medical History:   Diagnosis Date    Abnormal Pap smear of cervix     intermittently abnl paps    Anxiety     Depression     Fibroid     Herpes     High risk medication use 2023    Injury of back     MS (multiple sclerosis)     OAB (overactive bladder)        Past Surgical History:   Procedure Laterality Date    AUGMENTATION MAMMAPLASTY       SECTION      x 1    D & C WITH SUCTION      x 1    MANDIBLE SURGERY      WISDOM TOOTH EXTRACTION           Current  Outpatient Medications:     amphetamine-dextroamphetamine (ADDERALL) 15 MG tablet, Take 1 tablet by mouth., Disp: , Rfl:     busPIRone (BUSPAR) 5 MG tablet, , Disp: , Rfl:     desvenlafaxine (PRISTIQ) 50 MG 24 hr tablet, , Disp: , Rfl:     estradiol (ESTRACE) 0.1 MG/GM vaginal cream, Insert 1 applicator into the vagina 2 (Two) Times a Week., Disp: 45 g, Rfl: 6    Gemtesa 75 MG tablet, , Disp: , Rfl:     Magnesium ([employer] Triple Magnesium Complex) 400 MG capsule, Take  by mouth., Disp: , Rfl:     Mayzent 2 MG tablet, Take 1 tablet by mouth Daily., Disp: , Rfl:     nitrofurantoin, macrocrystal-monohydrate, (MACROBID) 100 MG capsule, , Disp: , Rfl:     Semaglutide-Weight Management (Wegovy) 0.25 MG/0.5ML solution auto-injector, Inject 0.5 mL under the skin into the appropriate area as directed 1 (One) Time Per Week., Disp: 2 mL, Rfl: 0    CBD oil (cannabidiol) capsule, Take 1 capsule by mouth Daily. OTC hemp derivitive for MS (Patient not taking: Reported on 5/19/2025), Disp: , Rfl:     clonazePAM (KlonoPIN) 0.5 MG tablet, 1/2 to 1 tablet by mouth daily as needed for feelings of panic and anxiety caution could cause sedation falls do not drive if sedation (Patient not taking: Reported on 5/19/2025), Disp: 20 tablet, Rfl: 0    hydrOXYzine (ATARAX) 25 MG tablet, Take 1 tablet by mouth. (Patient not taking: Reported on 5/19/2025), Disp: , Rfl:     Mirabegron ER (MYRBETRIQ) 50 MG tablet sustained-release 24 hour 24 hr tablet, Take 50 mg by mouth Daily. (Patient not taking: Reported on 5/19/2025), Disp: , Rfl:     valACYclovir (VALTREX) 500 MG tablet, Take 1 tablet by mouth 2 (Two) Times a Day for 3 days., Disp: 6 tablet, Rfl: 6    venlafaxine XR (EFFEXOR-XR) 75 MG 24 hr capsule, TAKE 1 CAPSULE BY MOUTH EVERY NIGHT AT BEDTIME (Patient not taking: Reported on 5/19/2025), Disp: 90 capsule, Rfl: 1    Allergies   Allergen Reactions    Phenyltoloxamine-Acetaminophen Itching     Some type IV pain med causes itching       Social  "History     Tobacco Use    Smoking status: Former    Smokeless tobacco: Never   Vaping Use    Vaping status: Never Used   Substance Use Topics    Alcohol use: Yes     Alcohol/week: 9.0 standard drinks of alcohol     Types: 2 Glasses of wine, 4 Cans of beer, 3 Shots of liquor per week     Comment: pt says she \"used to have an alcohol problem\"    Drug use: No       Family History   Problem Relation Age of Onset    No Known Problems Father     No Known Problems Mother     Breast cancer Other     Ovarian cancer Neg Hx     Colon cancer Neg Hx     Deep vein thrombosis Neg Hx     Pulmonary embolism Neg Hx     Uterine cancer Neg Hx        Review of Systems   Constitutional:  Negative for activity change, appetite change, fatigue, fever and unexpected weight change.   Eyes:  Negative for photophobia and visual disturbance.   Respiratory:  Negative for cough and shortness of breath.    Cardiovascular:  Negative for chest pain and palpitations.   Gastrointestinal:  Negative for abdominal distention, abdominal pain, constipation, diarrhea and nausea.   Endocrine: Negative for cold intolerance and heat intolerance.   Genitourinary:  Negative for dyspareunia, dysuria, frequency, menstrual problem, pelvic pain, urgency, vaginal bleeding, vaginal discharge and vaginal pain.   Musculoskeletal:  Negative for back pain.   Skin:  Negative for color change and rash.   Neurological:  Negative for headaches.   Hematological:  Negative for adenopathy. Does not bruise/bleed easily.   Psychiatric/Behavioral:  Positive for sleep disturbance. Negative for dysphoric mood. The patient is not nervous/anxious.        /80   Ht 172.7 cm (67.99\")   Wt 89.1 kg (196 lb 6 oz)   LMP  (LMP Unknown)   Breastfeeding No   BMI 29.87 kg/m²     Physical Exam   Constitutional: She is oriented to person, place, and time. She appears well-developed.   HENT:   Head: Normocephalic and atraumatic.   Eyes: Conjunctivae are normal. No scleral icterus. "   Neck: No thyromegaly present.   Cardiovascular: Normal rate and regular rhythm.   Pulmonary/Chest: Effort normal and breath sounds normal. Right breast exhibits no inverted nipple, no mass, no nipple discharge, no skin change and no tenderness. Left breast exhibits no inverted nipple, no mass, no nipple discharge, no skin change and no tenderness.   Implants noted   Abdominal: Soft. Normal appearance and bowel sounds are normal. She exhibits no distension and no mass. There is no abdominal tenderness. There is no rebound and no guarding. No hernia.   Genitourinary:    Pelvic exam was performed with patient supine.   There is no rash, tenderness or lesion on the right labia. There is no rash, tenderness or lesion on the left labia. Uterus is not deviated, not enlarged, not fixed and not tender. Cervix exhibits no motion tenderness, no discharge and no friability. Right adnexum displays no mass, no tenderness and no fullness. Left adnexum displays no mass, no tenderness and no fullness.    No vaginal discharge, erythema, tenderness or bleeding.   No erythema, tenderness or bleeding in the vagina.    No foreign body in the vagina.      No signs of injury in the vagina.     Neurological: She is alert and oriented to person, place, and time.   Skin: Skin is warm and dry.   Psychiatric: Her behavior is normal. Mood, judgment and thought content normal.   Nursing note and vitals reviewed.         Assessment/Plan    1) GYN HM: normal pap/HPV 1/2024, demonstrated and encouraged.  2) STD screening: declines. Condoms encouraged.  3) Contraception:  4) Family Planning: family planning: childbearing completed, encourage folic acid daily  5) Diet and Exercise discussed  6) Smoking Status: No  7) C scope- pt declines, will send Cologuard. Pt is aware that these tests are not equivalent in the detection of colon cancer, encourage compliance.   8) MMG- UTD 8/2022 B1. , schedule MMG now, enc compliance  9) DEXA- plan age 57  10)  HSV 2+- ERX Valtrex 500 mg BID x  days prn outbreaks.  11) Insomnia- declines sleep med referral for now, will see her primary MD to discuss. Does not feel like she is having HF/NS  12) Parts of this document have been copied or forwarded from her previous visits and have been reviewed, updated and edited as indicated.   13)Follow up prn and 1 year annual.       Diagnoses and all orders for this visit:    1. Routine gynecological examination (Primary)  -     POC Urinalysis Dipstick    2. Screening for colon cancer  -     Cologuard - Stool, Per Rectum; Future    3. Encounter for screening mammogram for malignant neoplasm of breast  -     Mammo Screening Digital Tomosynthesis Bilateral With CAD; Future    4. HSV-2 infection    Other orders  -     valACYclovir (VALTREX) 500 MG tablet; Take 1 tablet by mouth 2 (Two) Times a Day for 3 days.  Dispense: 6 tablet; Refill: 6          Bronwyn Kaur MD  1/18/2024  12:45 EDT

## 2025-06-05 ENCOUNTER — HOSPITAL ENCOUNTER (OUTPATIENT)
Dept: MAMMOGRAPHY | Facility: HOSPITAL | Age: 55
Discharge: HOME | End: 2025-06-05
Admitting: OBSTETRICS & GYNECOLOGY
Payer: COMMERCIAL

## 2025-06-05 DIAGNOSIS — Z12.31 ENCOUNTER FOR SCREENING MAMMOGRAM FOR MALIGNANT NEOPLASM OF BREAST: ICD-10-CM

## 2025-06-05 PROCEDURE — 77063 BREAST TOMOSYNTHESIS BI: CPT

## 2025-06-05 PROCEDURE — 77067 SCR MAMMO BI INCL CAD: CPT
